# Patient Record
Sex: MALE | Race: WHITE | Employment: OTHER | URBAN - METROPOLITAN AREA
[De-identification: names, ages, dates, MRNs, and addresses within clinical notes are randomized per-mention and may not be internally consistent; named-entity substitution may affect disease eponyms.]

---

## 2017-07-01 ENCOUNTER — LAB ENCOUNTER (OUTPATIENT)
Dept: LAB | Facility: HOSPITAL | Age: 77
End: 2017-07-01
Attending: INTERNAL MEDICINE
Payer: MEDICARE

## 2017-07-01 DIAGNOSIS — E03.9 PRIMARY HYPOTHYROIDISM: ICD-10-CM

## 2017-07-01 DIAGNOSIS — E55.9 VITAMIN D DEFICIENCY: ICD-10-CM

## 2017-07-01 DIAGNOSIS — I10 ESSENTIAL HYPERTENSION, BENIGN: ICD-10-CM

## 2017-07-01 DIAGNOSIS — Z00.00 ROUTINE GENERAL MEDICAL EXAMINATION AT A HEALTH CARE FACILITY: ICD-10-CM

## 2017-07-01 DIAGNOSIS — D50.8 IRON DEFICIENCY ANEMIA DUE TO DIETARY CAUSES: ICD-10-CM

## 2017-07-01 DIAGNOSIS — E78.00 PURE HYPERCHOLESTEROLEMIA: ICD-10-CM

## 2017-07-01 DIAGNOSIS — E11.9 DIABETES MELLITUS WITHOUT COMPLICATION (HCC): ICD-10-CM

## 2017-07-01 LAB
ALBUMIN SERPL-MCNC: 3.1 G/DL (ref 3.5–4.8)
ALP LIVER SERPL-CCNC: 91 U/L (ref 45–117)
ALT SERPL-CCNC: 19 U/L (ref 17–63)
AST SERPL-CCNC: 14 U/L (ref 15–41)
BASOPHILS # BLD AUTO: 0.05 X10(3) UL (ref 0–0.1)
BASOPHILS NFR BLD AUTO: 0.6 %
BILIRUB SERPL-MCNC: 0.4 MG/DL (ref 0.1–2)
BUN BLD-MCNC: 25 MG/DL (ref 8–20)
CALCIUM BLD-MCNC: 8.7 MG/DL (ref 8.3–10.3)
CHLORIDE: 111 MMOL/L (ref 101–111)
CHOLEST SMN-MCNC: 171 MG/DL (ref ?–200)
CO2: 28 MMOL/L (ref 22–32)
CREAT BLD-MCNC: 1.21 MG/DL (ref 0.7–1.3)
EOSINOPHIL # BLD AUTO: 0.29 X10(3) UL (ref 0–0.3)
EOSINOPHIL NFR BLD AUTO: 3.6 %
ERYTHROCYTE [DISTWIDTH] IN BLOOD BY AUTOMATED COUNT: 16.1 % (ref 11.5–16)
EST. AVERAGE GLUCOSE BLD GHB EST-MCNC: 126 MG/DL (ref 68–126)
GLUCOSE BLD-MCNC: 85 MG/DL (ref 70–99)
HBA1C MFR BLD HPLC: 6 % (ref ?–5.7)
HCT VFR BLD AUTO: 42 % (ref 37–53)
HDLC SERPL-MCNC: 62 MG/DL (ref 45–?)
HDLC SERPL: 2.76 {RATIO} (ref ?–4.97)
HGB BLD-MCNC: 13.4 G/DL (ref 13–17)
IMMATURE GRANULOCYTE COUNT: 0.03 X10(3) UL (ref 0–1)
IMMATURE GRANULOCYTE RATIO %: 0.4 %
LDLC SERPL CALC-MCNC: 86 MG/DL (ref ?–130)
LYMPHOCYTES # BLD AUTO: 1.69 X10(3) UL (ref 0.9–4)
LYMPHOCYTES NFR BLD AUTO: 20.9 %
M PROTEIN MFR SERPL ELPH: 7.3 G/DL (ref 6.1–8.3)
MCH RBC QN AUTO: 29.6 PG (ref 27–33.2)
MCHC RBC AUTO-ENTMCNC: 31.9 G/DL (ref 31–37)
MCV RBC AUTO: 92.9 FL (ref 80–99)
MONOCYTES # BLD AUTO: 0.51 X10(3) UL (ref 0.1–0.6)
MONOCYTES NFR BLD AUTO: 6.3 %
NEUTROPHIL ABS PRELIM: 5.52 X10 (3) UL (ref 1.3–6.7)
NEUTROPHILS # BLD AUTO: 5.52 X10(3) UL (ref 1.3–6.7)
NEUTROPHILS NFR BLD AUTO: 68.2 %
NONHDLC SERPL-MCNC: 109 MG/DL (ref ?–130)
PLATELET # BLD AUTO: 233 10(3)UL (ref 150–450)
POTASSIUM SERPL-SCNC: 4 MMOL/L (ref 3.6–5.1)
PSA SERPL-MCNC: 1.41 NG/ML (ref 0.01–4)
RBC # BLD AUTO: 4.52 X10(6)UL (ref 3.8–5.8)
RED CELL DISTRIBUTION WIDTH-SD: 54.7 FL (ref 35.1–46.3)
SODIUM SERPL-SCNC: 145 MMOL/L (ref 136–144)
TRIGLYCERIDES: 114 MG/DL (ref ?–150)
TSI SER-ACNC: 3.23 MIU/ML (ref 0.35–5.5)
VLDL: 23 MG/DL (ref 5–40)
WBC # BLD AUTO: 8.1 X10(3) UL (ref 4–13)

## 2017-07-01 PROCEDURE — 85025 COMPLETE CBC W/AUTO DIFF WBC: CPT

## 2017-07-01 PROCEDURE — 84443 ASSAY THYROID STIM HORMONE: CPT

## 2017-07-01 PROCEDURE — 84153 ASSAY OF PSA TOTAL: CPT

## 2017-07-01 PROCEDURE — 80053 COMPREHEN METABOLIC PANEL: CPT

## 2017-07-01 PROCEDURE — 80061 LIPID PANEL: CPT

## 2017-07-01 PROCEDURE — 83036 HEMOGLOBIN GLYCOSYLATED A1C: CPT

## 2017-07-01 PROCEDURE — 36415 COLL VENOUS BLD VENIPUNCTURE: CPT

## 2017-07-01 PROCEDURE — 82652 VIT D 1 25-DIHYDROXY: CPT

## 2017-07-03 LAB — 1,25-DIHYDROXYVITAMIN D: 28.5 PG/ML

## 2018-01-01 PROCEDURE — 87040 BLOOD CULTURE FOR BACTERIA: CPT | Performed by: FAMILY MEDICINE

## 2018-09-06 ENCOUNTER — HOSPITAL ENCOUNTER (INPATIENT)
Facility: HOSPITAL | Age: 78
LOS: 3 days | Discharge: NURSING, REHAB OR LONG-TERM CARE | DRG: 178 | End: 2018-09-11
Attending: EMERGENCY MEDICINE | Admitting: HOSPITALIST
Payer: MEDICARE

## 2018-09-06 ENCOUNTER — APPOINTMENT (OUTPATIENT)
Dept: GENERAL RADIOLOGY | Facility: HOSPITAL | Age: 78
DRG: 178 | End: 2018-09-06
Attending: EMERGENCY MEDICINE
Payer: MEDICARE

## 2018-09-06 ENCOUNTER — APPOINTMENT (OUTPATIENT)
Dept: CT IMAGING | Facility: HOSPITAL | Age: 78
DRG: 178 | End: 2018-09-06
Attending: EMERGENCY MEDICINE
Payer: MEDICARE

## 2018-09-06 DIAGNOSIS — I63.89 CEREBROVASCULAR ACCIDENT (CVA) DUE TO OTHER MECHANISM (HCC): Primary | ICD-10-CM

## 2018-09-06 DIAGNOSIS — R07.9 CHEST PAIN OF UNCERTAIN ETIOLOGY: ICD-10-CM

## 2018-09-06 PROBLEM — I63.9 CEREBROVASCULAR ACCIDENT (HCC): Status: ACTIVE | Noted: 2018-09-06

## 2018-09-06 LAB
ALBUMIN SERPL-MCNC: 2.3 G/DL (ref 3.5–4.8)
ALBUMIN/GLOB SERPL: 0.4 {RATIO} (ref 1–2)
ALP LIVER SERPL-CCNC: 95 U/L (ref 45–117)
ALT SERPL-CCNC: 15 U/L (ref 17–63)
ANION GAP SERPL CALC-SCNC: 8 MMOL/L (ref 0–18)
AST SERPL-CCNC: 21 U/L (ref 15–41)
BASOPHILS # BLD AUTO: 0.03 X10(3) UL (ref 0–0.1)
BASOPHILS NFR BLD AUTO: 0.3 %
BILIRUB SERPL-MCNC: 0.3 MG/DL (ref 0.1–2)
BUN BLD-MCNC: 16 MG/DL (ref 8–20)
BUN/CREAT SERPL: 17.8 (ref 10–20)
CALCIUM BLD-MCNC: 8.7 MG/DL (ref 8.3–10.3)
CHLORIDE SERPL-SCNC: 101 MMOL/L (ref 101–111)
CHOLEST SMN-MCNC: 121 MG/DL (ref ?–200)
CO2 SERPL-SCNC: 26 MMOL/L (ref 22–32)
CREAT BLD-MCNC: 0.9 MG/DL (ref 0.7–1.3)
D-DIMER: 11.2 UG/ML FEU (ref 0–0.49)
EOSINOPHIL # BLD AUTO: 0.3 X10(3) UL (ref 0–0.3)
EOSINOPHIL NFR BLD AUTO: 2.9 %
ERYTHROCYTE [DISTWIDTH] IN BLOOD BY AUTOMATED COUNT: 19 % (ref 11.5–16)
GLOBULIN PLAS-MCNC: 5.9 G/DL (ref 2.5–4)
GLUCOSE BLD-MCNC: 113 MG/DL (ref 70–99)
HCT VFR BLD AUTO: 39.6 % (ref 37–53)
HDLC SERPL-MCNC: 51 MG/DL (ref 40–59)
HGB BLD-MCNC: 12.2 G/DL (ref 13–17)
IMMATURE GRANULOCYTE COUNT: 0.07 X10(3) UL (ref 0–1)
IMMATURE GRANULOCYTE RATIO %: 0.7 %
LACTIC ACID: 2 MMOL/L (ref 0.5–2)
LDLC SERPL CALC-MCNC: 44 MG/DL (ref ?–100)
LYMPHOCYTES # BLD AUTO: 1.01 X10(3) UL (ref 0.9–4)
LYMPHOCYTES NFR BLD AUTO: 9.9 %
M PROTEIN MFR SERPL ELPH: 8.2 G/DL (ref 6.1–8.3)
MCH RBC QN AUTO: 28.2 PG (ref 27–33.2)
MCHC RBC AUTO-ENTMCNC: 30.8 G/DL (ref 31–37)
MCV RBC AUTO: 91.5 FL (ref 80–99)
MONOCYTES # BLD AUTO: 0.45 X10(3) UL (ref 0.1–1)
MONOCYTES NFR BLD AUTO: 4.4 %
NEUTROPHIL ABS PRELIM: 8.36 X10 (3) UL (ref 1.3–6.7)
NEUTROPHILS # BLD AUTO: 8.36 X10(3) UL (ref 1.3–6.7)
NEUTROPHILS NFR BLD AUTO: 81.8 %
NONHDLC SERPL-MCNC: 70 MG/DL (ref ?–130)
OSMOLALITY SERPL CALC.SUM OF ELEC: 282 MOSM/KG (ref 275–295)
PLATELET # BLD AUTO: 336 10(3)UL (ref 150–450)
POTASSIUM SERPL-SCNC: 4.3 MMOL/L (ref 3.6–5.1)
RBC # BLD AUTO: 4.33 X10(6)UL (ref 3.8–5.8)
RED CELL DISTRIBUTION WIDTH-SD: 63.1 FL (ref 35.1–46.3)
SODIUM SERPL-SCNC: 135 MMOL/L (ref 136–144)
TRIGL SERPL-MCNC: 130 MG/DL (ref 30–149)
TROPONIN I SERPL-MCNC: <0.046 NG/ML (ref ?–0.05)
VLDLC SERPL CALC-MCNC: 26 MG/DL (ref 0–30)
WBC # BLD AUTO: 10.2 X10(3) UL (ref 4–13)

## 2018-09-06 PROCEDURE — 80053 COMPREHEN METABOLIC PANEL: CPT | Performed by: EMERGENCY MEDICINE

## 2018-09-06 PROCEDURE — 71045 X-RAY EXAM CHEST 1 VIEW: CPT | Performed by: EMERGENCY MEDICINE

## 2018-09-06 PROCEDURE — 85025 COMPLETE CBC W/AUTO DIFF WBC: CPT

## 2018-09-06 PROCEDURE — 93005 ELECTROCARDIOGRAM TRACING: CPT

## 2018-09-06 PROCEDURE — 36415 COLL VENOUS BLD VENIPUNCTURE: CPT

## 2018-09-06 PROCEDURE — 80061 LIPID PANEL: CPT | Performed by: HOSPITALIST

## 2018-09-06 PROCEDURE — 96360 HYDRATION IV INFUSION INIT: CPT

## 2018-09-06 PROCEDURE — 83605 ASSAY OF LACTIC ACID: CPT | Performed by: EMERGENCY MEDICINE

## 2018-09-06 PROCEDURE — 87040 BLOOD CULTURE FOR BACTERIA: CPT | Performed by: EMERGENCY MEDICINE

## 2018-09-06 PROCEDURE — 85025 COMPLETE CBC W/AUTO DIFF WBC: CPT | Performed by: EMERGENCY MEDICINE

## 2018-09-06 PROCEDURE — 80053 COMPREHEN METABOLIC PANEL: CPT

## 2018-09-06 PROCEDURE — 93010 ELECTROCARDIOGRAM REPORT: CPT

## 2018-09-06 PROCEDURE — 84484 ASSAY OF TROPONIN QUANT: CPT | Performed by: EMERGENCY MEDICINE

## 2018-09-06 PROCEDURE — 99285 EMERGENCY DEPT VISIT HI MDM: CPT

## 2018-09-06 PROCEDURE — 70450 CT HEAD/BRAIN W/O DYE: CPT | Performed by: EMERGENCY MEDICINE

## 2018-09-06 PROCEDURE — 71275 CT ANGIOGRAPHY CHEST: CPT | Performed by: EMERGENCY MEDICINE

## 2018-09-06 PROCEDURE — 85378 FIBRIN DEGRADE SEMIQUANT: CPT | Performed by: EMERGENCY MEDICINE

## 2018-09-06 RX ORDER — HEPARIN SODIUM 5000 [USP'U]/ML
5000 INJECTION, SOLUTION INTRAVENOUS; SUBCUTANEOUS EVERY 12 HOURS SCHEDULED
Status: DISCONTINUED | OUTPATIENT
Start: 2018-09-06 | End: 2018-09-11

## 2018-09-06 RX ORDER — ACETAMINOPHEN 650 MG/1
650 SUPPOSITORY RECTAL EVERY 6 HOURS PRN
Status: DISCONTINUED | OUTPATIENT
Start: 2018-09-06 | End: 2018-09-11

## 2018-09-06 RX ORDER — ACETAMINOPHEN 325 MG/1
650 TABLET ORAL EVERY 6 HOURS PRN
Status: DISCONTINUED | OUTPATIENT
Start: 2018-09-06 | End: 2018-09-11

## 2018-09-06 RX ORDER — OMEPRAZOLE 20 MG/1
20 CAPSULE, DELAYED RELEASE ORAL
Status: ON HOLD | COMMUNITY
End: 2018-09-11

## 2018-09-06 RX ORDER — MELATONIN
1000 DAILY
Status: ON HOLD | COMMUNITY
End: 2019-01-01

## 2018-09-06 RX ORDER — SIMVASTATIN 20 MG
20 TABLET ORAL NIGHTLY
Status: ON HOLD | COMMUNITY
End: 2018-09-11

## 2018-09-06 NOTE — ED INITIAL ASSESSMENT (HPI)
Hx of stroke while in [facility] 2 months ago. Just landed from there and pt was brought here by his family due to cough.  No fever

## 2018-09-07 LAB
ANION GAP SERPL CALC-SCNC: 7 MMOL/L (ref 0–18)
BASOPHILS # BLD AUTO: 0.03 X10(3) UL (ref 0–0.1)
BASOPHILS NFR BLD AUTO: 0.5 %
BUN BLD-MCNC: 12 MG/DL (ref 8–20)
BUN/CREAT SERPL: 20 (ref 10–20)
CALCIUM BLD-MCNC: 7.6 MG/DL (ref 8.3–10.3)
CHLORIDE SERPL-SCNC: 106 MMOL/L (ref 101–111)
CO2 SERPL-SCNC: 26 MMOL/L (ref 22–32)
CREAT BLD-MCNC: 0.6 MG/DL (ref 0.7–1.3)
EOSINOPHIL # BLD AUTO: 0.28 X10(3) UL (ref 0–0.3)
EOSINOPHIL NFR BLD AUTO: 5.1 %
ERYTHROCYTE [DISTWIDTH] IN BLOOD BY AUTOMATED COUNT: 18.6 % (ref 11.5–16)
GLUCOSE BLD-MCNC: 72 MG/DL (ref 70–99)
GLUCOSE BLD-MCNC: 74 MG/DL (ref 65–99)
GLUCOSE BLD-MCNC: 81 MG/DL (ref 65–99)
HCT VFR BLD AUTO: 31.1 % (ref 37–53)
HGB BLD-MCNC: 9.6 G/DL (ref 13–17)
IMMATURE GRANULOCYTE COUNT: 0.02 X10(3) UL (ref 0–1)
IMMATURE GRANULOCYTE RATIO %: 0.4 %
LYMPHOCYTES # BLD AUTO: 0.74 X10(3) UL (ref 0.9–4)
LYMPHOCYTES NFR BLD AUTO: 13.5 %
MCH RBC QN AUTO: 28.5 PG (ref 27–33.2)
MCHC RBC AUTO-ENTMCNC: 30.9 G/DL (ref 31–37)
MCV RBC AUTO: 92.3 FL (ref 80–99)
MONOCYTES # BLD AUTO: 0.33 X10(3) UL (ref 0.1–1)
MONOCYTES NFR BLD AUTO: 6 %
NEUTROPHIL ABS PRELIM: 4.1 X10 (3) UL (ref 1.3–6.7)
NEUTROPHILS # BLD AUTO: 4.1 X10(3) UL (ref 1.3–6.7)
NEUTROPHILS NFR BLD AUTO: 74.5 %
OSMOLALITY SERPL CALC.SUM OF ELEC: 286 MOSM/KG (ref 275–295)
PLATELET # BLD AUTO: 229 10(3)UL (ref 150–450)
POTASSIUM SERPL-SCNC: 3.7 MMOL/L (ref 3.6–5.1)
PROCALCITONIN SERPL-MCNC: <0.11 NG/ML
RBC # BLD AUTO: 3.37 X10(6)UL (ref 3.8–5.8)
RED CELL DISTRIBUTION WIDTH-SD: 63.3 FL (ref 35.1–46.3)
SODIUM SERPL-SCNC: 139 MMOL/L (ref 136–144)
WBC # BLD AUTO: 5.5 X10(3) UL (ref 4–13)

## 2018-09-07 PROCEDURE — 97162 PT EVAL MOD COMPLEX 30 MIN: CPT

## 2018-09-07 PROCEDURE — S0077 INJECTION, CLINDAMYCIN PHOSP: HCPCS | Performed by: INTERNAL MEDICINE

## 2018-09-07 PROCEDURE — 97760 ORTHOTIC MGMT&TRAING 1ST ENC: CPT

## 2018-09-07 PROCEDURE — 97166 OT EVAL MOD COMPLEX 45 MIN: CPT

## 2018-09-07 PROCEDURE — 82962 GLUCOSE BLOOD TEST: CPT

## 2018-09-07 PROCEDURE — 84145 PROCALCITONIN (PCT): CPT | Performed by: INTERNAL MEDICINE

## 2018-09-07 PROCEDURE — 85025 COMPLETE CBC W/AUTO DIFF WBC: CPT | Performed by: HOSPITALIST

## 2018-09-07 PROCEDURE — 97530 THERAPEUTIC ACTIVITIES: CPT

## 2018-09-07 PROCEDURE — 87040 BLOOD CULTURE FOR BACTERIA: CPT | Performed by: INTERNAL MEDICINE

## 2018-09-07 PROCEDURE — 97535 SELF CARE MNGMENT TRAINING: CPT

## 2018-09-07 PROCEDURE — 80048 BASIC METABOLIC PNL TOTAL CA: CPT | Performed by: HOSPITALIST

## 2018-09-07 PROCEDURE — 92610 EVALUATE SWALLOWING FUNCTION: CPT

## 2018-09-07 RX ORDER — ASPIRIN 325 MG
325 TABLET, DELAYED RELEASE (ENTERIC COATED) ORAL DAILY
Status: DISCONTINUED | OUTPATIENT
Start: 2018-09-07 | End: 2018-09-11

## 2018-09-07 RX ORDER — ONDANSETRON 4 MG/1
4 TABLET, ORALLY DISINTEGRATING ORAL EVERY 6 HOURS PRN
Status: DISCONTINUED | OUTPATIENT
Start: 2018-09-07 | End: 2018-09-11

## 2018-09-07 RX ORDER — ALBUTEROL SULFATE 2.5 MG/3ML
2.5 SOLUTION RESPIRATORY (INHALATION) EVERY 6 HOURS PRN
Status: DISCONTINUED | OUTPATIENT
Start: 2018-09-07 | End: 2018-09-11

## 2018-09-07 RX ORDER — ASPIRIN 300 MG
300 SUPPOSITORY, RECTAL RECTAL DAILY
Status: DISCONTINUED | OUTPATIENT
Start: 2018-09-07 | End: 2018-09-11

## 2018-09-07 RX ORDER — ONDANSETRON 2 MG/ML
4 INJECTION INTRAMUSCULAR; INTRAVENOUS EVERY 6 HOURS PRN
Status: DISCONTINUED | OUTPATIENT
Start: 2018-09-07 | End: 2018-09-11

## 2018-09-07 RX ORDER — CLINDAMYCIN PHOSPHATE 600 MG/50ML
600 INJECTION INTRAVENOUS EVERY 8 HOURS
Status: DISCONTINUED | OUTPATIENT
Start: 2018-09-07 | End: 2018-09-09

## 2018-09-07 RX ORDER — POTASSIUM CHLORIDE 14.9 MG/ML
20 INJECTION INTRAVENOUS ONCE
Status: COMPLETED | OUTPATIENT
Start: 2018-09-07 | End: 2018-09-07

## 2018-09-07 NOTE — HOME CARE LIAISON
[address]. MET WITH SON AT BEDSIDE. CONFIRMED PATIENT AND SON WILL WANT HH SERVICES AT [place]. [facility] 34 BED ORDER TO ORBIT. HAVE CALLED AND REQUESTED DR. Naa Wilson SIGN FOR HH AND COMPLETE PAPERWORK FOR DME IF NEEDED.

## 2018-09-07 NOTE — PHYSICAL THERAPY NOTE
PHYSICAL THERAPY EVALUATION - INPATIENT     Room Number: 7780/1101-Y  Evaluation Date: 9/7/2018  Type of Evaluation: Initial  Physician Order: PT Eval and Treat    Presenting Problem: cough, recent CVA  Reason for Therapy: Mobility Dysfunction and Leopold Sydney aphasia    RANGE OF MOTION AND STRENGTH ASSESSMENT  Upper extremity ROM and strength: See OT eval    Lower extremity ROM is within functional limits except for the following:   Right Hip flexion <1/2 PROM, no AROM  Left Hip flexion <1/2 AROM  Right Knee ex Pt able to sit EOB x 10 minutes, participate in WB through RUE and also LUE through elbow in sitting, noted significant contracture/tighness in R side trunk and R hip-pt able to participate in stretch.  Pt is unable to stand safely due to contractures in RL transfers, hospital bed. Also recommend HHPT for further family/caregiver education training, HEP program instruction.   DISCHARGE RECOMMENDATIONS  PT Discharge Recommendations: 24 hour care/supervision;Home with home health PT/OT    PLAN  PT Treatment Plan

## 2018-09-07 NOTE — H&P
[facility] Patient Status:  Observation    1940 MRN [id number]   [facility] 7NE-A Attending Karen Angelo MD   Hosp Day # 0 PCP Primo Andres MD     Cc: cough    History of Present Illne by mouth every morning before breakfast. Disp:  Rfl:  Past Week at 0900   Sertraline HCl 50 MG Oral Tab Take 50 mg by mouth daily. Disp:  Rfl:  Past Week at 0900   simvastatin 20 MG Oral Tab Take 20 mg by mouth nightly.  Disp:  Rfl:  Past Week at [facility] 92.3   PLT  336.0  229.0       Recent Labs   Lab  09/06/18   1801  09/07/18   0526   NA  135*  139   K  4.3  3.7   CL  101  106   CO2  26.0  26.0   BUN  16  12   CREATSERUM  0.90  0.60*   CA  8.7  7.6*   GLU  113*  72       Recent Labs   Lab  09/06/18   1 sided weakness and suspected aspiration   - CT brain reviewed as above without acute change  - daily ASA, resume statin once po  - PT / OT / SLP SAM starkey consult    # cough, pulmonary fibrosis  - seen on CT on admission, progression since 2015  - elevated MCH  28.2  28.5   MCHC  30.8*  30.9*   RDW  19.0*  18.6*   NEPRELIM  8.36*  4.10   WBC  10.2  5.5   PLT  336.0  229.0     Recent Labs   Lab  09/06/18   1801  09/07/18   0526   GLU  113*  72   BUN  16  12   CREATSERUM  0.90  0.60*   GFRAA  94  111   GFRNA

## 2018-09-07 NOTE — CONSULTS
Pulmonary H&P/Consult       NAME: [address] [place] [address] Street: 8980/0204-G - MRN: [id number] - Age: 66year old - :  1940    Date of Admission: 2018  5:38 PM  Admission Diagnosis: Chest pain of uncertain etiology [J34.08]  Cerebrovascular accident daily. Disp:  Rfl:  Past Week at Unknown time   METHOTREXATE OR Take by mouth.  Disp:  Rfl:  Past Week at Unknown time   NON FORMULARY Paracetamol 500mg 1 tablet as needed for headaches or fever Disp:  Rfl:  Past Week at Unknown time   NON FORMULARY nitrofu Sodium (Porcine)  5,000 Units Subcutaneous 2 times per day     Continuous Infusing Medication:    PRN Medication:albuterol sulfate, ondansetron **OR** ondansetron HCl, acetaminophen, acetaminophen     REVIEW OF SYSTEMS:   GENERAL:  feels well otherwise   S symmetrical, trachea midline, no adenopathy;        thyroid:  No enlargement/tenderness/nodules; no carotid    bruit or JVD   Back:     Symmetric, no curvature, ROM normal, no CVA tenderness   Lungs:     Clear to auscultation bilaterally, respirations unla Range Status   07/30/2012 03:40 PM 3.5 3.5 - 4.8 g/dL Final   ----------    Imaging: CT chest reviewed- progression of previously seen pulm fibrosis mckenzie, debris noted in airways, more on the right than the left    ASSESSMENT/PLAN:    1.  AMS  -unclear what

## 2018-09-07 NOTE — SLP NOTE
ADULT SWALLOWING EVALUATION    ASSESSMENT    ASSESSMENT/OVERALL IMPRESSION:  Order received for bedside swallow evaluation per protocol. Pt is a 66year old male with hx of pneumonia and CVA 2 months prior resulting in aphasia.  Pt was admitted after demons Recommendations: Non-oral  Treatment Plan/Recommendations: Videofluoroscopic swallow study  Discharge Recommendations/Plan: Undetermined    HISTORY   MEDICAL HISTORY  Reason for Referral: R/O aspiration    Problem List  Principal Problem:    [place] Impaired  Laryngeal Elevation Timing: Appears impaired  Laryngeal Elevation Strength: Appears impaired  Laryngeal Elevation Coordination: Appears impaired  (Please note: Silent aspiration cannot be evaluated clinically.  Videofluoroscopic Swallow Study is r

## 2018-09-07 NOTE — PLAN OF CARE
Pt non-verbal, RUE contracted, Rt sided weakness  On RA, NSR per tele, denies any pain  Incontinent of bladder and bowel, needs attended too   Pt kept NPO, Speech eval completed, Video swallow ordered  PT/OT rec home w/ HH and 24 hr caregiver  PLAN: social

## 2018-09-07 NOTE — ED PROVIDER NOTES
Patient Seen in: [facility] Emergency Department    History   Patient presents with:  Cough/URI    Stated Complaint: cough, stroke 2 months ago while in [place], just arrived.  brought by family fo*    HPI    72-year-old male, long-time smoker, known 90  Resp: 16  Temp: 97.6 °F (36.4 °C)  Temp src: Temporal  SpO2: 95 %  O2 Device: None (Room air)    Current:BP 97/71   Pulse 80   Temp 97.6 °F (36.4 °C) (Temporal)   Resp 18   Wt 77.1 kg   SpO2 95%   BMI 23.71 kg/m²         Physical Exam      Constitution Neutrophil Absolute 8.36 (*)     All other components within normal limits   LACTIC ACID, PLASMA - Normal   TROPONIN I - Normal   CBC WITH DIFFERENTIAL WITH PLATELET    Narrative:      The following orders were created for panel order CBC WITH DIFFERENTI patient is seen but was cardiology in the past it seems.         Disposition and Plan     Clinical Impression:  Cerebrovascular accident (CVA) due to other mechanism [facility])  (primary encounter diagnosis)  Chest pain of uncertain etiology    Disposition:  Adm

## 2018-09-07 NOTE — OCCUPATIONAL THERAPY NOTE
OCCUPATIONAL THERAPY EVALUATION - INPATIENT     Room Number: 6681/5544-N  Evaluation Date: 9/7/2018  Type of Evaluation: Initial  Presenting Problem: L sided CVA 2 months ago    Physician Order: IP Consult to Occupational Therapy  Reason for Therapy: ADL/I functional limits except for the following:  R UE contracture noted, elbow, wrist'    Upper extremity strength is within functional limits except for the following;  R sided flaccid, contracture    COORDINATION  Gross Motor   R UE and LE contracture   Fine bed;Needs met;Call light within reach;RN aware of session/findings; All patient questions and concerns addressed;SCDs in place; Family present    ASSESSMENT     Patient is a 66year old male admitted on 9/6/2018 for CVA 2 months ago.  Complete medical history will perform PROM R UE independently. 2. Family will javier R resting arm splint on independently. 3. Pt and family will perform supine to sit with max A x 1 to prepare for functional transfer.

## 2018-09-07 NOTE — CM/SW NOTE
Patient is aphasic, secondary to sustained stroke about 2 months ago while in [facility].   Family intends to have patient live with them at home, and has cleared out a room for him, yet they have no medical bed, or any other medical equipment to help manage h

## 2018-09-07 NOTE — PLAN OF CARE
Problem: Impaired Swallowing  Goal: Minimize aspiration risk  Interventions:  NPO until VSS  Outcome: Progressing

## 2018-09-07 NOTE — PLAN OF CARE
Problem: Impaired Swallowing  Goal: Minimize aspiration risk  Interventions:  NPO   Outcome: Progressing

## 2018-09-07 NOTE — PLAN OF CARE
Assumed care at 2300. Pt admitted to unit via cart. Pt aphasic, non verbal. Family at bedside. Family unsure of pt's diet after stroke, made NPO until speech eval.   Family noticing some pain in patient, tylenol suppository given.    Pt resting in bed c

## 2018-09-08 ENCOUNTER — APPOINTMENT (OUTPATIENT)
Dept: GENERAL RADIOLOGY | Facility: HOSPITAL | Age: 78
DRG: 178 | End: 2018-09-08
Attending: INTERNAL MEDICINE
Payer: MEDICARE

## 2018-09-08 ENCOUNTER — APPOINTMENT (OUTPATIENT)
Dept: GENERAL RADIOLOGY | Facility: HOSPITAL | Age: 78
DRG: 178 | End: 2018-09-08
Attending: HOSPITALIST
Payer: MEDICARE

## 2018-09-08 LAB
ERYTHROCYTE [DISTWIDTH] IN BLOOD BY AUTOMATED COUNT: 18.6 % (ref 11.5–16)
GLUCOSE BLD-MCNC: 119 MG/DL (ref 65–99)
GLUCOSE BLD-MCNC: 124 MG/DL (ref 65–99)
GLUCOSE BLD-MCNC: 177 MG/DL (ref 65–99)
GLUCOSE BLD-MCNC: 70 MG/DL (ref 65–99)
GLUCOSE BLD-MCNC: 76 MG/DL (ref 65–99)
GLUCOSE BLD-MCNC: 82 MG/DL (ref 65–99)
GLUCOSE BLD-MCNC: 84 MG/DL (ref 65–99)
GLUCOSE BLD-MCNC: 94 MG/DL (ref 65–99)
HAV IGM SER QL: 1.7 MG/DL (ref 1.8–2.5)
HCT VFR BLD AUTO: 32.3 % (ref 37–53)
HGB BLD-MCNC: 10 G/DL (ref 13–17)
MCH RBC QN AUTO: 28.3 PG (ref 27–33.2)
MCHC RBC AUTO-ENTMCNC: 31 G/DL (ref 31–37)
MCV RBC AUTO: 91.5 FL (ref 80–99)
PLATELET # BLD AUTO: 237 10(3)UL (ref 150–450)
POTASSIUM SERPL-SCNC: 4 MMOL/L (ref 3.6–5.1)
RBC # BLD AUTO: 3.53 X10(6)UL (ref 3.8–5.8)
RED CELL DISTRIBUTION WIDTH-SD: 61.4 FL (ref 35.1–46.3)
WBC # BLD AUTO: 5.9 X10(3) UL (ref 4–13)

## 2018-09-08 PROCEDURE — 92611 MOTION FLUOROSCOPY/SWALLOW: CPT

## 2018-09-08 PROCEDURE — 83735 ASSAY OF MAGNESIUM: CPT | Performed by: HOSPITALIST

## 2018-09-08 PROCEDURE — 82962 GLUCOSE BLOOD TEST: CPT

## 2018-09-08 PROCEDURE — 71046 X-RAY EXAM CHEST 2 VIEWS: CPT | Performed by: INTERNAL MEDICINE

## 2018-09-08 PROCEDURE — 84132 ASSAY OF SERUM POTASSIUM: CPT | Performed by: HOSPITALIST

## 2018-09-08 PROCEDURE — 74230 X-RAY XM SWLNG FUNCJ C+: CPT | Performed by: HOSPITALIST

## 2018-09-08 PROCEDURE — S0077 INJECTION, CLINDAMYCIN PHOSP: HCPCS | Performed by: INTERNAL MEDICINE

## 2018-09-08 PROCEDURE — 85027 COMPLETE CBC AUTOMATED: CPT | Performed by: PHYSICIAN ASSISTANT

## 2018-09-08 RX ORDER — DEXTROSE MONOHYDRATE 25 G/50ML
INJECTION, SOLUTION INTRAVENOUS
Status: DISPENSED
Start: 2018-09-08 | End: 2018-09-08

## 2018-09-08 RX ORDER — MAGNESIUM OXIDE 400 MG (241.3 MG MAGNESIUM) TABLET
400 TABLET ONCE
Status: COMPLETED | OUTPATIENT
Start: 2018-09-08 | End: 2018-09-08

## 2018-09-08 RX ORDER — POLYVINYL ALCOHOL 14 MG/ML
1 SOLUTION/ DROPS OPHTHALMIC 4 TIMES DAILY PRN
Status: DISCONTINUED | OUTPATIENT
Start: 2018-09-08 | End: 2018-09-11

## 2018-09-08 RX ORDER — DEXTROSE MONOHYDRATE 25 G/50ML
50 INJECTION, SOLUTION INTRAVENOUS
Status: DISCONTINUED | OUTPATIENT
Start: 2018-09-08 | End: 2018-09-11

## 2018-09-08 NOTE — PROGRESS NOTES
Pulmonary Progress Note      NAME: Joao Deluna - ROOM: [id number] - MRN: [id number] - Age: 66year old - : 1940    Assessment/Plan:    1. AMS  -uncertain baseline  -may be related to infection  2.  Pulm Fibrosis  -chronic, progressive  -consi BUN  16  12   --    CREATSERUM  0.90  0.60*   --    GFRAA  94  111   --    GFRNAA  82  96   --    CA  8.7  7.6*   --    ALB  2.3*   --    --    NA  135*  139   --    K  4.3  3.7  4.0   CL  101  106   --    CO2  26.0  26.0   --    ALKPHO  95   --    --

## 2018-09-08 NOTE — SLP NOTE
ADULT VIDEOFLUOROSCOPIC SWALLOWING STUDY    Admission Date: 9/6/2018  Evaluation Date: 09/08/18  Radiologist: Dr. Nathalia Dean   Diet Recommendations - Solids: Mechanical soft ground  Diet Recommendations - Liquid: Nectar thick  ASPIRATION MN Clinical correlation recommended. HEADT CT 9/6/18: Large area of encephalomalacia change of the left MCA distribution. No evidence of acute intracranial process.     Reason for Referral: R/O aspiration      Family/Patient Goals:  \"he hasn't eaten i Penetration: None  Tracheal Aspiration: None     HARD SOLID  Oral Phase of Swallow (VFSS - Hard Solid): Impaired  Bolus Retrieval (VFSS - Hard Solid): Impaired  Bilabial Seal (VFSS - Hard Solid): Impaired  Bolus Formation (VFSS - Hard Solid):  Impaired  Carles Gutter intervention for dysphagia tx, diet texture analysis, education/training, and diet upgrade as patient progresses. EDUCATION/INSTRUCTION  Reviewed results and recommendations with patient/family/caregiver.   Agreement/Understanding verbalized and all ques

## 2018-09-08 NOTE — PLAN OF CARE
Problem: Impaired Swallowing  Goal: Minimize aspiration risk  Interventions:  - Patient should be alert and upright for all feedings (90 degrees preferred)  - Offer food and liquids at a slow rate  - Encourage small bites of food and small sips of liquid,

## 2018-09-08 NOTE — PLAN OF CARE
Assumed care at [address]. Patient alert, eyes tracking movements in room. Family at bedside. VSS on 2L NC. Family reports pt coughing up sputum. NSR per tele. Resting in bed comfortably. Will continue to monitor.      0796- Pt POC 70, D50% given per

## 2018-09-08 NOTE — PLAN OF CARE
Assumed patient care 0730. Patient alert, arouses to voice, stimulation. VSS during shift, weaned to room air during day, sats maintained greater than 90%. Video swallow completed today. Aspiration and fall precautions maintained.  Pt incontinent, brief in

## 2018-09-09 LAB
ATRIAL RATE: 80 BPM
BASOPHILS # BLD AUTO: 0.03 X10(3) UL (ref 0–0.1)
BASOPHILS NFR BLD AUTO: 0.4 %
EOSINOPHIL # BLD AUTO: 0.1 X10(3) UL (ref 0–0.3)
EOSINOPHIL NFR BLD AUTO: 1.3 %
ERYTHROCYTE [DISTWIDTH] IN BLOOD BY AUTOMATED COUNT: 18.7 % (ref 11.5–16)
GLUCOSE BLD-MCNC: 89 MG/DL (ref 65–99)
HAV IGM SER QL: 1.8 MG/DL (ref 1.8–2.5)
HCT VFR BLD AUTO: 30.9 % (ref 37–53)
HGB BLD-MCNC: 9.7 G/DL (ref 13–17)
IMMATURE GRANULOCYTE COUNT: 0.03 X10(3) UL (ref 0–1)
IMMATURE GRANULOCYTE RATIO %: 0.4 %
LYMPHOCYTES # BLD AUTO: 1.17 X10(3) UL (ref 0.9–4)
LYMPHOCYTES NFR BLD AUTO: 15.1 %
MCH RBC QN AUTO: 28.6 PG (ref 27–33.2)
MCHC RBC AUTO-ENTMCNC: 31.4 G/DL (ref 31–37)
MCV RBC AUTO: 91.2 FL (ref 80–99)
MONOCYTES # BLD AUTO: 0.61 X10(3) UL (ref 0.1–1)
MONOCYTES NFR BLD AUTO: 7.9 %
NEUTROPHIL ABS PRELIM: 5.82 X10 (3) UL (ref 1.3–6.7)
NEUTROPHILS # BLD AUTO: 5.82 X10(3) UL (ref 1.3–6.7)
NEUTROPHILS NFR BLD AUTO: 74.9 %
P AXIS: 7 DEGREES
P-R INTERVAL: 148 MS
PLATELET # BLD AUTO: 218 10(3)UL (ref 150–450)
Q-T INTERVAL: 404 MS
QRS DURATION: 86 MS
QTC CALCULATION (BEZET): 465 MS
R AXIS: -7 DEGREES
RBC # BLD AUTO: 3.39 X10(6)UL (ref 3.8–5.8)
RED CELL DISTRIBUTION WIDTH-SD: 62.2 FL (ref 35.1–46.3)
T AXIS: 9 DEGREES
VENTRICULAR RATE: 80 BPM
WBC # BLD AUTO: 7.8 X10(3) UL (ref 4–13)

## 2018-09-09 PROCEDURE — S0077 INJECTION, CLINDAMYCIN PHOSP: HCPCS | Performed by: INTERNAL MEDICINE

## 2018-09-09 PROCEDURE — 94640 AIRWAY INHALATION TREATMENT: CPT

## 2018-09-09 PROCEDURE — 94664 DEMO&/EVAL PT USE INHALER: CPT

## 2018-09-09 PROCEDURE — 97164 PT RE-EVAL EST PLAN CARE: CPT

## 2018-09-09 PROCEDURE — 97530 THERAPEUTIC ACTIVITIES: CPT

## 2018-09-09 PROCEDURE — 85025 COMPLETE CBC W/AUTO DIFF WBC: CPT | Performed by: HOSPITALIST

## 2018-09-09 PROCEDURE — 83735 ASSAY OF MAGNESIUM: CPT | Performed by: HOSPITALIST

## 2018-09-09 PROCEDURE — 82962 GLUCOSE BLOOD TEST: CPT

## 2018-09-09 PROCEDURE — 97110 THERAPEUTIC EXERCISES: CPT

## 2018-09-09 RX ORDER — MAGNESIUM OXIDE 400 MG (241.3 MG MAGNESIUM) TABLET
400 TABLET ONCE
Status: COMPLETED | OUTPATIENT
Start: 2018-09-09 | End: 2018-09-09

## 2018-09-09 RX ORDER — TIZANIDINE 4 MG/1
2 TABLET ORAL EVERY 8 HOURS PRN
Status: DISCONTINUED | OUTPATIENT
Start: 2018-09-09 | End: 2018-09-11

## 2018-09-09 RX ORDER — ATORVASTATIN CALCIUM 10 MG/1
10 TABLET, FILM COATED ORAL NIGHTLY
Status: DISCONTINUED | OUTPATIENT
Start: 2018-09-09 | End: 2018-09-11

## 2018-09-09 RX ORDER — PANTOPRAZOLE SODIUM 20 MG/1
20 TABLET, DELAYED RELEASE ORAL
Status: DISCONTINUED | OUTPATIENT
Start: 2018-09-09 | End: 2018-09-11

## 2018-09-09 NOTE — CM/SW NOTE
Pt now recommending DUARTE. SW attempted to contact the pt's son again. The voicemail was full. SW to follow up regarding DUARTE placement.

## 2018-09-09 NOTE — PLAN OF CARE
Assumed care at [address]. Patient alert, appears comfortable. VSS on 2L NC overnight. NSR per tele. Son at bedside, addressed concerns and questions about POC. Incontinent, briefed, needs attended to. Resting in bed comfortably.  Will continue to monit

## 2018-09-09 NOTE — PROGRESS NOTES
[facility] Hospitalist Progress Note     Edgar Boles Patient Status:  Inpatient    1940 MRN [id number]   [facility] 7NE-A Attending Marybel Chatterjee MD   Hosp Day # 1 PCP Karyle Alice, MD     CC: follow up    SUBJECTIVE:  Son and Katerine Ricci Medication:  PRN Medication:glucose **OR** Glucose-Vitamin C **OR** dextrose **OR** glucose **OR** Glucose-Vitamin C, Polyvinyl Alcohol, albuterol sulfate, ondansetron **OR** ondansetron HCl, acetaminophen, acetaminophen        Assessment/Plan:     [employer] bedside.     Cristobal Nur MD   [address] [place] [address] Hospitalist  [phone]

## 2018-09-09 NOTE — PLAN OF CARE
Assumed patient care 0730. Patient alert, arouses to voice, stimulation. Oxygen saturations maintained greater then 90% on room air during shift. Diet mechanical soft ground with nectar thick liquids, pt tolerating.  Aspiration and fall precautions maintain

## 2018-09-09 NOTE — PROGRESS NOTES
Pulmonary Progress Note      NAME: Yolanda Eddy - ROOM: 3742/0013-W - MRN: [id number] - Age: 66year old - : 1940    Assessment/Plan:  1. AMS  -uncertain baseline  -may be related to infection  2.  Pulm Fibrosis  -chronic, progressive  -[place] 09/08/18   0525   GLU  113*  72   --    BUN  16  12   --    CREATSERUM  0.90  0.60*   --    GFRAA  94  111   --    GFRNAA  82  96   --    CA  8.7  7.6*   --    ALB  2.3*   --    --    NA  135*  139   --    K  4.3  3.7  4.0   CL  101  106   --    CO2  26.0

## 2018-09-09 NOTE — CM/SW NOTE
SW attempted to call pt's son for a second time. Mailbox is full. Unable to leave a message. PT now recommending DUARTE.

## 2018-09-09 NOTE — CM/SW NOTE
SW spoke w/RN regarding pt. Rn stated the pt is concerned PT is recommending HHPT/24 hour care. She stated the family is also wanting rehab. Rn stating the pt is unable to move.  Awaiting PT re eval. Per RN, family was stating the pt was independent prior t

## 2018-09-10 ENCOUNTER — APPOINTMENT (OUTPATIENT)
Dept: GENERAL RADIOLOGY | Facility: HOSPITAL | Age: 78
DRG: 178 | End: 2018-09-10
Attending: HOSPITALIST
Payer: MEDICARE

## 2018-09-10 LAB
GLUCOSE BLD-MCNC: 95 MG/DL (ref 65–99)
HAV IGM SER QL: 1.8 MG/DL (ref 1.8–2.5)

## 2018-09-10 PROCEDURE — 82962 GLUCOSE BLOOD TEST: CPT

## 2018-09-10 PROCEDURE — 92523 SPEECH SOUND LANG COMPREHEN: CPT

## 2018-09-10 PROCEDURE — 83735 ASSAY OF MAGNESIUM: CPT | Performed by: HOSPITALIST

## 2018-09-10 PROCEDURE — 92526 ORAL FUNCTION THERAPY: CPT

## 2018-09-10 PROCEDURE — 74018 RADEX ABDOMEN 1 VIEW: CPT | Performed by: HOSPITALIST

## 2018-09-10 PROCEDURE — 94640 AIRWAY INHALATION TREATMENT: CPT

## 2018-09-10 PROCEDURE — 97110 THERAPEUTIC EXERCISES: CPT

## 2018-09-10 RX ORDER — BISACODYL 10 MG
10 SUPPOSITORY, RECTAL RECTAL
Status: DISCONTINUED | OUTPATIENT
Start: 2018-09-10 | End: 2018-09-11

## 2018-09-10 RX ORDER — POLYETHYLENE GLYCOL 3350 17 G/17G
17 POWDER, FOR SOLUTION ORAL DAILY PRN
Qty: 10 EACH | Refills: 0 | Status: ON HOLD | OUTPATIENT
Start: 2018-09-10 | End: 2019-01-01

## 2018-09-10 RX ORDER — MAGNESIUM OXIDE 400 MG (241.3 MG MAGNESIUM) TABLET
400 TABLET ONCE
Status: COMPLETED | OUTPATIENT
Start: 2018-09-10 | End: 2018-09-10

## 2018-09-10 RX ORDER — POLYETHYLENE GLYCOL 3350 17 G/17G
17 POWDER, FOR SOLUTION ORAL DAILY PRN
Status: DISCONTINUED | OUTPATIENT
Start: 2018-09-10 | End: 2018-09-11

## 2018-09-10 RX ORDER — SENNA AND DOCUSATE SODIUM 50; 8.6 MG/1; MG/1
2 TABLET, FILM COATED ORAL 2 TIMES DAILY
Status: DISCONTINUED | OUTPATIENT
Start: 2018-09-10 | End: 2018-09-11

## 2018-09-10 NOTE — OCCUPATIONAL THERAPY NOTE
OCCUPATIONAL THERAPY TREATMENT NOTE - INPATIENT     Room Number: 1588/4566-C  Session: 1   Number of Visits to Meet Established Goals: 5    Presenting Problem: L sided CVA 2 months ago    History related to current admission: Pt was admitted on 9/6 from [facility] Educated her about R UE PROM and issued written handout. She demonstrated understanding. Patient End of Session: In bed;Needs met;Call light within reach; All patient questions and concerns addressed    ASSESSMENT   Pt and family met goals.   Pt has been

## 2018-09-10 NOTE — PLAN OF CARE
Assumed care at 0730  Pt unable to follow commands, nonverbal  Aspiration precautions maintained  Pt tolerating nectar thick liquids and ground diet  1 soft BM  Pt turned and repositioned frequently  Awaiting DUARTE placement  Pt and family updated on POC.  Ne

## 2018-09-10 NOTE — PLAN OF CARE
Pt alert. RA.  .  NSR. Family at bedside. Pt c/o cramping to left leg. Tylenol PO for relief.  notified and Shiela Oreilly as needed. Pt briefed. Will continue to monitor.

## 2018-09-10 NOTE — SLP NOTE
SPEECH DAILY NOTE - INPATIENT    ASSESSMENT & PLAN   ASSESSMENT  Pt seen for dysphagia tx to assess tolerance of recommended diet, ensure adherence to aspiration precautions, and provide pt/family education.  Pt received sitting up in bed, being fed lunch t session(s).   Not targeted; pt unable to follow commands   Goal #4 The patient will utilize compensatory strategies as outlined by  VFSS including Slow rate, Small bites, Small sips, Upright 90 degrees, Supervision with meals with MOD assistance 80 % of the

## 2018-09-10 NOTE — PROGRESS NOTES
[facility] Hospitalist Progress Note     Aliya Garcia Patient Status:  Inpatient    1940 MRN [id number]   [facility] 7NE-A Attending Rj Dean MD   Hosp Day # 2 PCP Mahad Simon MD     CC: follow up    SUBJECTIVE:  Mr. Lisbeth torres • Pantoprazole Sodium  20 mg Oral QAM AC   • Ipratropium Bromide  0.5 mg Nebulization TID   • prednisoLONE  2.5 mg Oral Daily   • aspirin EC  325 mg Oral Daily    Or   • aspirin  300 mg Rectal Daily   • Heparin Sodium (Porcine)  5,000 Units Subcutaneous

## 2018-09-10 NOTE — CM/SW NOTE
Spoke with pt's DIL Lenoard Leyden who said they are agreeable to having pt go to [facility]. They would like him to go to [facility]. Referral made to [facility] via ecin. DON screen requested. Waiting for a response.

## 2018-09-10 NOTE — SLP NOTE
SPEECH/LANGUAGE/COGNITIVE EVALUATION - INPATIENT    Admission Date: 9/6/2018  Evaluation Date: 09/10/18    Reason for Referral: Stroke protocol    ASSESSMENT & PLAN   ASSESSMENT & IMPRESSION  Communication evaluation completed.  Per chart review, pt had a C evidence of acute intracranial process. Dictated by: Yun Khalil MD on 9/06/2018 at 20:54\"         Patient/Family Goals:  To improve communication    Interdisciplinary Communication: Discussed with RN    Patient, family and/or caregiver has been informe

## 2018-09-10 NOTE — PROGRESS NOTES
Pulmonary Progress Note        NAME: [address] [place] [address] Street: 9108/7719-A - MRN: [id number] - Age: 66year old - : 1940        Last 24hrs: No events overnight, eating more per son    OBJECTIVE:   09/10/18  0500 09/10/18  0745 09/10/18  1011 09/10/ ARTERIALPH, ARTERIALPO2, ARTERIALPCO2, ARTERIALHCO3    No results for input(s): BNP in the last 72 hours.     Invalid input(s): TROPI    INR   Date/Time Value Ref Range Status   07/30/2012 03:40 PM 1.09 0.93 - 1.26 Final     Comment:     INR Therapeutic Int

## 2018-09-11 VITALS
DIASTOLIC BLOOD PRESSURE: 63 MMHG | TEMPERATURE: 98 F | OXYGEN SATURATION: 93 % | SYSTOLIC BLOOD PRESSURE: 109 MMHG | HEART RATE: 79 BPM | RESPIRATION RATE: 18 BRPM | WEIGHT: 170 LBS | BODY MASS INDEX: 24 KG/M2

## 2018-09-11 LAB
BILIRUB UR QL STRIP.AUTO: NEGATIVE
CLARITY UR REFRACT.AUTO: CLEAR
COLOR UR AUTO: YELLOW
ERYTHROCYTE [DISTWIDTH] IN BLOOD BY AUTOMATED COUNT: 18.8 % (ref 11.5–16)
GLUCOSE BLD-MCNC: 111 MG/DL (ref 65–99)
GLUCOSE BLD-MCNC: 91 MG/DL (ref 65–99)
GLUCOSE UR STRIP.AUTO-MCNC: NEGATIVE MG/DL
HAV IGM SER QL: 2 MG/DL (ref 1.8–2.5)
HCT VFR BLD AUTO: 31.9 % (ref 37–53)
HGB BLD-MCNC: 9.9 G/DL (ref 13–17)
KETONES UR STRIP.AUTO-MCNC: NEGATIVE MG/DL
LEUKOCYTE ESTERASE UR QL STRIP.AUTO: NEGATIVE
MCH RBC QN AUTO: 28.2 PG (ref 27–33.2)
MCHC RBC AUTO-ENTMCNC: 31 G/DL (ref 31–37)
MCV RBC AUTO: 90.9 FL (ref 80–99)
NITRITE UR QL STRIP.AUTO: NEGATIVE
PH UR STRIP.AUTO: 6 [PH] (ref 4.5–8)
PLATELET # BLD AUTO: 232 10(3)UL (ref 150–450)
PROT UR STRIP.AUTO-MCNC: NEGATIVE MG/DL
RBC # BLD AUTO: 3.51 X10(6)UL (ref 3.8–5.8)
RBC UR QL AUTO: NEGATIVE
RED CELL DISTRIBUTION WIDTH-SD: 63.1 FL (ref 35.1–46.3)
SP GR UR STRIP.AUTO: 1.01 (ref 1–1.03)
UROBILINOGEN UR STRIP.AUTO-MCNC: <2 MG/DL
WBC # BLD AUTO: 7.4 X10(3) UL (ref 4–13)

## 2018-09-11 PROCEDURE — 81003 URINALYSIS AUTO W/O SCOPE: CPT | Performed by: PHYSICIAN ASSISTANT

## 2018-09-11 PROCEDURE — 85027 COMPLETE CBC AUTOMATED: CPT | Performed by: PHYSICIAN ASSISTANT

## 2018-09-11 PROCEDURE — 94640 AIRWAY INHALATION TREATMENT: CPT

## 2018-09-11 PROCEDURE — 92507 TX SP LANG VOICE COMM INDIV: CPT

## 2018-09-11 PROCEDURE — 82962 GLUCOSE BLOOD TEST: CPT

## 2018-09-11 PROCEDURE — 92526 ORAL FUNCTION THERAPY: CPT

## 2018-09-11 PROCEDURE — 97110 THERAPEUTIC EXERCISES: CPT

## 2018-09-11 PROCEDURE — 83735 ASSAY OF MAGNESIUM: CPT | Performed by: HOSPITALIST

## 2018-09-11 RX ORDER — ASPIRIN 325 MG
325 TABLET ORAL DAILY
Qty: 20 TABLET | Refills: 0 | Status: ON HOLD | COMMUNITY
Start: 2018-09-12 | End: 2019-01-01

## 2018-09-11 RX ORDER — TIZANIDINE 2 MG/1
2 TABLET ORAL EVERY 8 HOURS PRN
Qty: 20 TABLET | Refills: 0 | Status: ON HOLD | OUTPATIENT
Start: 2018-09-11 | End: 2019-01-01

## 2018-09-11 RX ORDER — ACETAMINOPHEN 325 MG/1
650 TABLET ORAL EVERY 6 HOURS PRN
Qty: 10 TABLET | Refills: 0 | Status: ON HOLD | COMMUNITY
Start: 2018-09-11 | End: 2019-01-01

## 2018-09-11 RX ORDER — ASPIRIN 325 MG
325 TABLET ORAL DAILY
Status: DISCONTINUED | OUTPATIENT
Start: 2018-09-12 | End: 2018-09-11

## 2018-09-11 RX ORDER — POLYVINYL ALCOHOL 14 MG/ML
1 SOLUTION/ DROPS OPHTHALMIC 4 TIMES DAILY PRN
Qty: 15 ML | Refills: 0 | Status: SHIPPED | OUTPATIENT
Start: 2018-09-11 | End: 2018-09-18 | Stop reason: ALTCHOICE

## 2018-09-11 RX ORDER — ATORVASTATIN CALCIUM 10 MG/1
10 TABLET, FILM COATED ORAL NIGHTLY
Qty: 30 TABLET | Refills: 0 | Status: SHIPPED | OUTPATIENT
Start: 2018-09-11 | End: 2018-01-01

## 2018-09-11 NOTE — SLP NOTE
SPEECH DAILY NOTE - INPATIENT    ASSESSMENT & PLAN   ASSESSMENT  Pt seen for dysphagia tx to assess tolerance with recommended diet, ensure proper utilization of aspiration precautions and provide pt/family education, in addition to aphasia tx.  Pt awake, a with 50 % accuracy within   3 session(s).   Progressing   Goal #2 The patient will answer Charlotte yes/no questions with 40 % accuracy within   3 session(s).     Progressing   Goal #3 The patient will recite Serialized Sentences/Sing Songs or automatic spee

## 2018-09-11 NOTE — PLAN OF CARE
Pt ready for discharge. [facility] ambulance here. Papers given to them for transport to [facility]. Family member asking to wait for other family member to be here. This family was all notified of discharge time earlier today.  Informed them once ambu

## 2018-09-11 NOTE — DISCHARGE SUMMARY
General Medicine Discharge Summary     Patient ID:  Baby Masker  66year old  1/25/1940    Admit date: 9/6/2018    Discharge date and time: 9/11/18    Attending Physician: Gogo Mora MD     [facility] PE  - off antibiotics, sats remain stable on RA  - appreciate pulmonary consult during admission     # Anemia  - Hg drop 12.2--> 9.6 with IVF without s/s of blood loss, suspect dilutional  - Hg stable at 9.9 on day of discharge     # hypoglycemia, improved 1 tablet (10 mg total) by mouth nightly. Replaces:  simvastatin 20 MG Tabs     omeprazole 2mg/ml Susp  Commonly known as:  PRILOSEC  Take 10 mL (20 mg total) by mouth daily.   Replaces:  omeprazole 20 MG Cpdr     PEG 3350 Pack  Commonly known as:  Onita Bassfield Soln  · TiZANidine HCl 2 MG Tabs     Information about where to get these medications is not yet available    Ask your nurse or doctor about these medications  · acetaminophen 325 MG Tabs  · aspirin 325 MG Tabs         Activity: activity as tolerated, isra

## 2018-09-11 NOTE — PHYSICAL THERAPY NOTE
PHYSICAL THERAPY TREATMENT NOTE - INPATIENT    Room Number: 5587/8437-X     Session: 1   Number of Visits to Meet Established Goals: 5    Presenting Problem: cough, recent CVA     History related to current admission:      Pt is 66year old male admitted Need to walk in hospital room?: Total   -   Climbing 3-5 steps with a railing?: Total       AM-PAC Score:  Raw Score: 8   PT Approx Degree of Impairment Score: 86.62%   Standardized Score (AM-PAC Scale): 28.58   CMS Modifier (G-Code): CM    FUNCTIONAL ABIL rehabilitation     PLAN  PT Treatment Plan: Bed mobility; Body mechanics; Endurance; Energy conservation;Patient education; Family education;Strengthening;Stair training;Transfer training;Balance training  Rehab Potential : Guarded  Frequency (Obs): 3x/week

## 2018-09-11 NOTE — CM/SW NOTE
[facility] accepted pt. Pt is ready for d/c today. RN to call report to [phone]. Called Kayce at [facility] to coordinate d/c time. Arranged [facility] to  pt at 5:00pm.  Spoke with son Katie Beyer to notify him of pt's d/c time.

## 2018-09-12 NOTE — CM/SW NOTE
09/12/18 0900   Discharge disposition   Expected discharge disposition Skilled Nurs   Name of Jordon Vallejo   Patient is Discharged to a [address] [place] Yes   Discharge transportation [employer]

## 2018-09-18 PROBLEM — J84.10 PULMONARY FIBROSIS (HCC): Status: ACTIVE | Noted: 2018-09-18

## 2018-09-18 PROBLEM — M05.79 RHEUMATOID ARTHRITIS INVOLVING MULTIPLE SITES WITH POSITIVE RHEUMATOID FACTOR (HCC): Status: ACTIVE | Noted: 2018-09-18

## 2018-09-18 PROBLEM — R07.9 CHEST PAIN OF UNCERTAIN ETIOLOGY: Status: RESOLVED | Noted: 2018-09-06 | Resolved: 2018-09-18

## 2018-09-19 ENCOUNTER — APPOINTMENT (OUTPATIENT)
Dept: GENERAL RADIOLOGY | Facility: HOSPITAL | Age: 78
DRG: 871 | End: 2018-09-19
Attending: EMERGENCY MEDICINE
Payer: MEDICARE

## 2018-09-19 ENCOUNTER — APPOINTMENT (OUTPATIENT)
Dept: CT IMAGING | Facility: HOSPITAL | Age: 78
DRG: 871 | End: 2018-09-19
Attending: EMERGENCY MEDICINE
Payer: MEDICARE

## 2018-09-19 ENCOUNTER — HOSPITAL ENCOUNTER (INPATIENT)
Facility: HOSPITAL | Age: 78
LOS: 20 days | Discharge: NURSING, REHAB OR LONG-TERM CARE | DRG: 871 | End: 2018-10-10
Attending: EMERGENCY MEDICINE | Admitting: INTERNAL MEDICINE
Payer: MEDICARE

## 2018-09-19 DIAGNOSIS — D64.9 ANEMIA, UNSPECIFIED TYPE: ICD-10-CM

## 2018-09-19 DIAGNOSIS — E86.0 DEHYDRATION: Primary | ICD-10-CM

## 2018-09-19 PROCEDURE — 71275 CT ANGIOGRAPHY CHEST: CPT | Performed by: EMERGENCY MEDICINE

## 2018-09-19 PROCEDURE — 74177 CT ABD & PELVIS W/CONTRAST: CPT | Performed by: EMERGENCY MEDICINE

## 2018-09-19 PROCEDURE — 71045 X-RAY EXAM CHEST 1 VIEW: CPT | Performed by: EMERGENCY MEDICINE

## 2018-09-19 RX ORDER — MAGNESIUM HYDROXIDE/ALUMINUM HYDROXICE/SIMETHICONE 120; 1200; 1200 MG/30ML; MG/30ML; MG/30ML
30 SUSPENSION ORAL 4 TIMES DAILY PRN
Status: DISCONTINUED | OUTPATIENT
Start: 2018-09-19 | End: 2018-10-10

## 2018-09-19 RX ORDER — POLYETHYLENE GLYCOL 3350 17 G/17G
17 POWDER, FOR SOLUTION ORAL DAILY PRN
Status: DISCONTINUED | OUTPATIENT
Start: 2018-09-19 | End: 2018-10-10

## 2018-09-19 RX ORDER — METOCLOPRAMIDE HYDROCHLORIDE 5 MG/ML
10 INJECTION INTRAMUSCULAR; INTRAVENOUS EVERY 8 HOURS PRN
Status: DISCONTINUED | OUTPATIENT
Start: 2018-09-19 | End: 2018-10-10

## 2018-09-19 RX ORDER — SODIUM CHLORIDE 9 MG/ML
INJECTION, SOLUTION INTRAVENOUS CONTINUOUS
Status: CANCELLED | OUTPATIENT
Start: 2018-09-19 | End: 2018-09-19

## 2018-09-19 RX ORDER — ATORVASTATIN CALCIUM 10 MG/1
10 TABLET, FILM COATED ORAL NIGHTLY
Status: DISCONTINUED | OUTPATIENT
Start: 2018-09-19 | End: 2018-10-10

## 2018-09-19 RX ORDER — ENOXAPARIN SODIUM 100 MG/ML
40 INJECTION SUBCUTANEOUS DAILY
Status: DISCONTINUED | OUTPATIENT
Start: 2018-09-19 | End: 2018-10-03

## 2018-09-19 RX ORDER — ACETAMINOPHEN 325 MG/1
650 TABLET ORAL EVERY 6 HOURS PRN
Status: DISCONTINUED | OUTPATIENT
Start: 2018-09-19 | End: 2018-10-10

## 2018-09-19 RX ORDER — ASPIRIN 325 MG
325 TABLET ORAL DAILY
Status: DISCONTINUED | OUTPATIENT
Start: 2018-09-19 | End: 2018-10-10

## 2018-09-19 RX ORDER — ONDANSETRON 2 MG/ML
4 INJECTION INTRAMUSCULAR; INTRAVENOUS EVERY 6 HOURS PRN
Status: DISCONTINUED | OUTPATIENT
Start: 2018-09-19 | End: 2018-10-10

## 2018-09-19 RX ORDER — TIZANIDINE 4 MG/1
2 TABLET ORAL EVERY 8 HOURS PRN
Status: DISCONTINUED | OUTPATIENT
Start: 2018-09-19 | End: 2018-10-10

## 2018-09-19 RX ORDER — SODIUM CHLORIDE 9 MG/ML
INJECTION, SOLUTION INTRAVENOUS ONCE
Status: COMPLETED | OUTPATIENT
Start: 2018-09-19 | End: 2018-09-19

## 2018-09-19 RX ORDER — SODIUM CHLORIDE 9 MG/ML
INJECTION, SOLUTION INTRAVENOUS CONTINUOUS
Status: DISCONTINUED | OUTPATIENT
Start: 2018-09-19 | End: 2018-09-20

## 2018-09-19 RX ORDER — PANTOPRAZOLE SODIUM 20 MG/1
20 TABLET, DELAYED RELEASE ORAL
Status: DISCONTINUED | OUTPATIENT
Start: 2018-09-19 | End: 2018-09-22 | Stop reason: ALTCHOICE

## 2018-09-19 NOTE — ED PROVIDER NOTES
Patient Seen in: [facility] Emergency Department    History   Patient presents with:  Hypotension (cardiovascular)    Stated Complaint: Hypotension     HPI    66-year-old male with past medical history of CVA with expressive aphasia and right-sided bilaterally. No Rales, no rhonchi, no wheezing, no stridor. ABDOMEN: Soft, nondistended,non tender, bowel sounds are present, no rebound, no rigidity, no guarding. no pulsatile masses.  No CVA tenderness  Rectal exam: Guaiac negative brown stool  EXTREMITI EXAM: 09/19/2018  Patient No:  [id number]  Physician:  Christiano Stubbs  YOB: 1940    Past Medical History (entered by Technologist):    Reason For Exam (entered by Technologist):     Other Notes (entered by Technologist): pt non verbal   per MISAEL COHEN    DATE OF EXAM: 09/19/2018  Patient No:  [id number]  Physician:  Laurie Palmer  YOB: 1940    Past Medical History (entered by Technologist):    Reason For Exam (entered by Technologist):     Other Notes (entered by [employer]

## 2018-09-19 NOTE — CONSULTS
[facility]    NUTRITION INITIAL ASSESSMENT    Pt does not meet malnutrition criteria. NUTRITION DIAGNOSIS/PROBLEM:    Inadequate energy intake related to dysphagia 2/2 L MCA stroke as evidenced by family reported weight loss past 6 months.     Cassandra Arriaza Mass: mild depletion body fat triceps region per visual exam.     2. Fluid Accumulation: none per visual exam.    NUTRITION PRESCRIPTION:  Calories:1750- 2100 calories/day (25-30 calories per kg)  Protein: 84-98 grams protein/day (1.2-1.4 grams protein per

## 2018-09-19 NOTE — PHYSICAL THERAPY NOTE
PHYSICAL THERAPY EVALUATION - INPATIENT     Room Number: 816/942-T  Evaluation Date: 9/19/2018  Type of Evaluation: Re-evaluation  Physician Order: PT Eval and Treat    Presenting Problem: dehydration  Reason for Therapy: Mobility Dysfunction and Dischar to stimuli  · Attention Span:  appears intact  · Following Commands:  follows one-step commands inconsistently    RANGE OF MOTION AND STRENGTH ASSESSMENT  L shoulder flexion, elbow flexion/extension, wrist flexion/extension AAROM WFL  L hand  strength semi-supine in bed and non-speaking. Son and daughter present. Pt was assessed for AAROM and PROM of B UEs and LEs x3 at each joint noted above. He performed log roll with active reaching of L UE at mod A.  He transferred to sitting at EOB from R sidelying rehabilitation(ELOS 13-16 days)    PLAN  PT Treatment Plan: Bed mobility; Family education;Patient education;Range of motion;Strengthening;Transfer training;Neuromuscular re-educate  Rehab Potential : Fair  Frequency (Obs): 5x/week  Number of Visits to Meet

## 2018-09-19 NOTE — PROGRESS NOTES
NURSING ADMISSION NOTE      Patient admitted via Cart  Oriented to room. Safety precautions initiated. Bed in low position. Call light in reach.     Received pt from ED. BP's low 70s/50s, IVF infusing, otherwise afebrile, RR wnl, with productive coug

## 2018-09-19 NOTE — PLAN OF CARE
Problem: Impaired Functional Mobility  Goal: Achieve highest/safest level of mobility/gait  Interventions:  - Assess patient's functional ability and stability  - Promote increasing activity/tolerance for mobility  - Educate and engage patient/family in to

## 2018-09-19 NOTE — H&P
[facility] Patient Status:  Observation    1940 MRN [id number]   [facility] 5NW-A Attending Renetta Freeman, DO   Hosp Day # 0 PCP Cheryl Coyle MD     Cc: hypotension    History of Pres total) by mouth nightly. Disp: 30 tablet Rfl: 0 9/18/2018 at 2000   methotrexate 10 MG Oral Tab Take 1 tablet (10 mg total) by mouth once a week.  Disp: 4 tablet Rfl: 0 Past Week at Unknown time   TiZANidine HCl 2 MG Oral Tab Take 1 tablet (2 mg total) by m and RLE contractures with limited movement        Data Review:     Recent Labs   Lab  09/19/18   0102  09/19/18   0640   WBC  7.2  7.1   HGB  8.6*  8.4*   MCV  92.5  91.1   PLT  220.0  190.0       Recent Labs   Lab  09/19/18 0102 09/19/18   0640   NA  1 pulm toilet, RT    # anemia  - Hg noted at 8.4 without s/s of blood loss  - continue to follow CBC    # RA  - on weekly MTX and chronic prednisone  - hold MTX at present    # dysphagia  - SLP eval  - previously on mechanical ground with nectar thick liquid

## 2018-09-19 NOTE — PLAN OF CARE
Impaired Swallowing    • Minimize aspiration risk Progressing            Multidisciplinary Discharge Rounds held 9/19/2018.     Treatment team members present today include , , Charge Nurse,  Nurse, RT, PT and Pharmacy caring for [facility]

## 2018-09-19 NOTE — SLP NOTE
ADULT SWALLOWING EVALUATION    ASSESSMENT    ASSESSMENT/OVERALL IMPRESSION:  Pt seen this AM for bedside swallow evaluation. RN approved session. Pt cooperative, pleasant, and alert. No family or caregivers present at bedside.  Author of this note spoke to straws; Alternate consistencies;Small bites and sips; Extra sauce/gravy  Aspiration Precautions: Upright position; Slow rate;Small bites and sips; No straw;Cueing to swallow  Medication Administration Recommendations: Crushed in puree  Treatment Plan/Recommend Impaired  Mastication: Impaired  Retention: Impaired    Pharyngeal Phase of Swallow: Impaired  Laryngeal Elevation Timing: Appears impaired  Laryngeal Elevation Strength: Appears impaired  Laryngeal Elevation Coordination: Appears impaired  (Please note: S

## 2018-09-19 NOTE — ED INITIAL ASSESSMENT (HPI)
RN at nursing home took pts bp with a reading of 70 systolic. EMS notified, rechecked pts bp, SBP 70's. Patient given 800ml bolus and SBP now 80's.

## 2018-09-19 NOTE — CM/SW NOTE
Met with pt and pt's dtr, Sandra Albright at bedside. Pt had a recent hospital admission 9/6-9/11 and was discharged to [facility] in [facility]. Pt's dtr stated that pt had been very independent, traveling then had a CVA when in [facility].   They brought pt home for me

## 2018-09-19 NOTE — CONSULTS
Pulmonary / Critical Care H&P/Consult       NAME: [address] [place] [address] Street: 672/464-C - MRN: [id number] - Age: 66year old - :  1940    Date of Admission: 2018 12:35 AM  Admission Diagnosis: Dehydration [E86.0]  Anemia, unspecified type [D64. 9 (650 mg total) by mouth every 6 (six) hours as needed for Pain. Disp: 10 tablet Rfl: 0   aspirin 325 MG Oral Tab Take 1 tablet (325 mg total) by mouth daily. Disp: 20 tablet Rfl: 0   atorvastatin 10 MG Oral Tab Take 1 tablet (10 mg total) by mouth nightly. the 24 hours ending 09/19/18 1315    BP (!) 80/63 (BP Location: Left arm)   Pulse 74   Temp 98.2 °F (36.8 °C) (Axillary)   Resp 18   Ht 5' 10\" (1.778 m)   Wt 154 lb 3.2 oz (69.9 kg)   SpO2 96%   BMI 22.13 kg/m²     General Appearance:    Awake, nonverbal infection  2. Aspiration pneumonitis:   - seen by speech with modified diet ordered  - hold on abx  3. 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 O2 as needed.      For now will fol

## 2018-09-19 NOTE — PROGRESS NOTES
09/19/18 0661   Provider Notification   Reason for Communication Critical value  (BP 70s/50s)   Provider Name Other (comment)  Benny Douglas   Method of Communication Page   Response Phone call; No new orders   Notification Time 0840   Received pt from ED.  BP

## 2018-09-20 ENCOUNTER — APPOINTMENT (OUTPATIENT)
Dept: CV DIAGNOSTICS | Facility: HOSPITAL | Age: 78
DRG: 871 | End: 2018-09-20
Attending: HOSPITALIST
Payer: MEDICARE

## 2018-09-20 ENCOUNTER — APPOINTMENT (OUTPATIENT)
Dept: GENERAL RADIOLOGY | Facility: HOSPITAL | Age: 78
DRG: 871 | End: 2018-09-20
Attending: HOSPITALIST
Payer: MEDICARE

## 2018-09-20 PROBLEM — I95.9 SEVERE HYPOTENSION: Status: ACTIVE | Noted: 2018-09-20

## 2018-09-20 PROCEDURE — 71045 X-RAY EXAM CHEST 1 VIEW: CPT | Performed by: HOSPITALIST

## 2018-09-20 PROCEDURE — 93306 TTE W/DOPPLER COMPLETE: CPT | Performed by: HOSPITALIST

## 2018-09-20 RX ORDER — POTASSIUM CHLORIDE 20 MEQ/1
40 TABLET, EXTENDED RELEASE ORAL EVERY 4 HOURS
Status: DISPENSED | OUTPATIENT
Start: 2018-09-20 | End: 2018-09-20

## 2018-09-20 RX ORDER — SODIUM CHLORIDE 9 MG/ML
INJECTION, SOLUTION INTRAVENOUS ONCE
Status: COMPLETED | OUTPATIENT
Start: 2018-09-20 | End: 2018-09-20

## 2018-09-20 NOTE — PROGRESS NOTES
[facility] Hospitalist note    PCP: Elizabeth Benítez MD    Chief Complaint:  67 yo man with h/o CVA, pulm fibrosis, who presented with hypotension    SUBJECTIVE:  Pt was more alert this morning, niece at bedside.  Also came back and discussed care with pt's son o 0. 85  0.66*   --    CA  7.8*  7.6*   --    GLU  89  89   --        Recent Labs   Lab  09/19/18   0102   ALT  11*   AST  17   ALB  1.8*       No results for input(s): PGLU in the last 168 hours. No results for input(s): TROP in the last 168 hours.       Holly Rodríguez

## 2018-09-20 NOTE — PROGRESS NOTES
Pts BP in the 60s, O2 needs increasing from 2L to 5L to maintain O2 saturations >90%. MD paged, Labs ordered, Stat CXR ordered, 1L bolus ordered. WCTM patient at this time.  Possible transfer to ICU if BP doesn't respond to fluid bolus

## 2018-09-20 NOTE — OCCUPATIONAL THERAPY NOTE
Attempted to see pt this PM, pt not approp for therapy per RN, pt with drop in BP and increased O2 needs, OT will follow up tomorrow.

## 2018-09-20 NOTE — PROGRESS NOTES
Pulmonary Progress Note      NAME: Zoltan Talavera - ROOM: [address] [place] [address] - MRN: [id number] - Age: 66year old - : 1940    Assessment/Plan:    1.  Hypotension - hypovolemia vs sepsis vs relative adrenal insufficiency  - IVF, has responded appropriately (Oral)   Resp 16   Ht 5' 10\" (1.778 m)   Wt 154 lb 3.2 oz (69.9 kg)   SpO2 97%   BMI 22.13 kg/m²   Physical Exam:   General: sleepy, cooperative, no respiratory distress. HEENT: Normocephalic atraumatic. Lips, mucosa, and tongue normal.  No thrush noted.

## 2018-09-20 NOTE — PROGRESS NOTES
09/20/18 1233   Clinical Encounter Type   Visited With Patient and family together   Sacramental Encounters   Sacrament of Sick-Anointing Anointed   The patient was seen by Katy Madera.  Received prayer, Scripture, support and Sacrament of t

## 2018-09-20 NOTE — PLAN OF CARE
Impaired Functional Mobility    • Achieve highest/safest level of mobility/gait Progressing        Impaired Swallowing    • Minimize aspiration risk Progressing        WAYNE orientation but alert, expressive aphasia, hx CVA with right sided weakness.  No dist

## 2018-09-21 ENCOUNTER — APPOINTMENT (OUTPATIENT)
Dept: GENERAL RADIOLOGY | Facility: HOSPITAL | Age: 78
DRG: 871 | End: 2018-09-21
Attending: INTERNAL MEDICINE
Payer: MEDICARE

## 2018-09-21 PROCEDURE — 71045 X-RAY EXAM CHEST 1 VIEW: CPT | Performed by: INTERNAL MEDICINE

## 2018-09-21 RX ORDER — SODIUM CHLORIDE 9 MG/ML
INJECTION, SOLUTION INTRAVENOUS CONTINUOUS
Status: DISCONTINUED | OUTPATIENT
Start: 2018-09-21 | End: 2018-09-21

## 2018-09-21 NOTE — PROGRESS NOTES
Pulmonary Progress Note        NAME: [address] [place] [address] Street: 604/674-F - MRN: [id number] - Age: 66year old - : 1940        Last 24hrs: No events overnight, appears better than yesterday per family and nursing report but remains not much of a con CO2  27.0  25.0   --    --    BUN  14  11   --    --    CA  7.8*  7.6*   --    --        Recent Labs      09/19/18   0102   ALT  11*   AST  17   ALB  1.8*       Invalid input(s): ARTERIALPH, ARTERIALPO2, ARTERIALPCO2, ARTERIALHCO3    No results for input weaned off O2 at rest  - seen by speech with modified diet ordered  3. 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 O2 as needed.    4. Dispo - full code             Amarjit Jean

## 2018-09-21 NOTE — PLAN OF CARE
Impaired Functional Mobility    • Achieve highest/safest level of mobility/gait Progressing        Impaired Swallowing    • Minimize aspiration risk Progressing        WAYNE orientation but alert, hx of CVA, pt requiring 5L/min per nasal cannula to maintain

## 2018-09-21 NOTE — PROGRESS NOTES
[facility] Hospitalist note    PCP: Elizabeth Benítez MD    Chief Complaint:  65 yo man with h/o CVA, pulm fibrosis, who presented with hypotension    SUBJECTIVE:  Awake and sitting in chair, being fed by daughter.    More alert than yesterday  BP controlled    OB atorvastatin  10 mg Oral Nightly   • Sertraline HCl  50 mg Oral Daily   • Pantoprazole Sodium  20 mg Oral Before breakfast     • sodium chloride 100 mL/hr at 09/21/18 0925     acetaminophen, ondansetron HCl, Metoclopramide HCl, Ipratropium Bromide, TiZANid

## 2018-09-21 NOTE — PHYSICAL THERAPY NOTE
PHYSICAL THERAPY TREATMENT NOTE - INPATIENT    Room Number: 948/927-U     Session: 1   Number of Visits to Meet Established Goals: 5    Presenting Problem: dehydration  Reason for Therapy: Mobility Dysfunction and Discharge Planning     History related to Pressure: 397/81    AM-PAC '6-Clicks' INPATIENT SHORT FORM - BASIC MOBILITY  How much difficulty does the patient currently have. ..  -   Turning over in bed (including adjusting bedclothes, sheets and blankets)?: A Lot   -   Sitting down on and standing up O2 sats unable to measure during transfer due to short line. RN notified. DISCHARGE RECOMMENDATIONS  PT Discharge Recommendations: Sub-acute rehabilitation(ELOS 13-16 days)     PLAN  PT Treatment Plan: Bed mobility; Family education;Patient education;Ran

## 2018-09-21 NOTE — OCCUPATIONAL THERAPY NOTE
OCCUPATIONAL THERAPY EVALUATION - INPATIENT     Room Number: 170/769-C  Evaluation Date: 9/21/2018  Type of Evaluation: Initial  Presenting Problem: dehydration, CVA in May 2018    Physician Order: IP Consult to Occupational Therapy  Reason for Therapy: AD Unable to rate  Location: unknown       COGNITION  Overall Cognitive Status:  WAYNE - unable to assess    VISION  Current Vision: unable to assess    PERCEPTION  Overall Perception Status:   Impaired and no response to pain on right side    SENSATION  impair therapist completed MMT and ROM on pt, pt was able to assist with sit to stand with sit to stand lift, therapist assist pt to complete t/f to chair with sit to stand lift, assess tone, provide PROM of RUE. Patient End of Session: Up in chair;Needs met; Rafael eval/education;Patient/Family training;Patient/Family education;Cognitive reorientation; Endurance training;UE strengthening/ROM; Functional transfer training;Visual perceptual training  Rehab Potential : Good  Frequency (Obs): 5x/week  Number of Visits to [facility]

## 2018-09-22 RX ORDER — POTASSIUM CHLORIDE 20 MEQ/1
40 TABLET, EXTENDED RELEASE ORAL EVERY 4 HOURS
Status: COMPLETED | OUTPATIENT
Start: 2018-09-22 | End: 2018-09-22

## 2018-09-22 RX ORDER — LIDOCAINE 50 MG/G
1 PATCH TOPICAL DAILY PRN
Status: DISCONTINUED | OUTPATIENT
Start: 2018-09-22 | End: 2018-10-02

## 2018-09-22 NOTE — PLAN OF CARE
PATIENT ALERT/AWAKE. HIS EYES ARE OPEN AND HE TRACKS PEOPLE IN HIS ROOM. VSS. HE LOOKS COMFORTABLE. HE REMAINS APHASIC. SATURATING  ABOVE 92% ON ROOM AIR. HE HAS FAMILY IN HIS ROOM WHO HE SEEM TO RECOGNIZE. HIS RIGHT SIDE IS WEAK.   Kaye Liter

## 2018-09-22 NOTE — PROGRESS NOTES
[facility] hospitalist daily note  Patient was seen/examined on 9/22/18    S; family at bedside. Patient essentially nonverbal since his stroke.  He at times complaints of left leg pain    Medications in Epic    PE;   09/22/18  1100   BP: 110/56   Pulse: 59   Resp hemoglobin improved from 9/19/19    8) Hypernatremia; 0.9NS stopped.   Monitor BMP    9) hypokalemia replace per protocol     9) prophy- lovenox      Dr. Deandra Grace will see this patient on 9/23/18    Kellen Linn MD  [facility] hospitalist  [phone]

## 2018-09-22 NOTE — PROGRESS NOTES
Pulmonary Progress Note        NAME: [address] [place] [address] Street: 467/677-P - MRN: [id number] - Age: 66year old - : 1940    Past Medical History:  No date: Arthritis  No date: Prediabetes  No date: Pulmonary fibrosis ([facility]  2018: Stroke ([facility] 14  11   --    --   9   CREATSERUM  0.85  0.66*   --    --   0.73   GFRAA  96  107   --    --   103   GFRNAA  83  93   --    --   89   CA  7.8*  7.6*   --    --   7.9*   NA  138  137   --    --   145*   K  3.7  3.6  3.5*  4.0  3.3*   CL  104  107   --

## 2018-09-23 NOTE — PLAN OF CARE
Impaired Activities of Daily Living    • Achieve highest/safest level of independence in self care Not Progressing        Impaired Functional Mobility    • Achieve highest/safest level of mobility/gait Not Progressing          Impaired Swallowing    • Mini

## 2018-09-23 NOTE — PROGRESS NOTES
[facility] Hospitalist Progress Note                                                                   Mora BROWN 2.  1/25/1940    SUBJECTIVE: pt doing well.  No able to communicate but family [phone]    Assessment/Plan:  Principal Problem:    Dehydration  Active Problems:    Anemia, unspecified type    Severe hypotension      1) Hypotension- unclear etiology but improved now.    Concern for sepsis despite neg lactic acids, procal. cxr may be worse

## 2018-09-23 NOTE — CM/SW NOTE
Met with pt, pt's son and DIL to discuss discharge planning. Spoke at length regarding DC planning concerns. Discussed options for returning to [facility] vs considering another facility for rehab.   After discussion, pt's family would like pt to return to

## 2018-09-23 NOTE — PROGRESS NOTES
Mora BROWN 2. Patient Status:  Inpatient    1940 MRN [id number]   [facility] 5NW-A Attending Bob Franco DO   Hosp Day # 3 PCP Primo Andres MD     SUBJECTIVE: no new events. No cough/phlegm, fevers. normal; no masses,  no organomegaly  Extremities: extremities normal, atraumatic, no cyanosis or edema        Lab Results   Component Value Date    K 3.9 09/22/2018     Lab Results   Component Value Date    PT 13.8 07/30/2012    INR 1.09 07/30/2012

## 2018-09-24 ENCOUNTER — APPOINTMENT (OUTPATIENT)
Dept: CT IMAGING | Facility: HOSPITAL | Age: 78
DRG: 871 | End: 2018-09-24
Attending: INTERNAL MEDICINE
Payer: MEDICARE

## 2018-09-24 PROCEDURE — 71275 CT ANGIOGRAPHY CHEST: CPT | Performed by: INTERNAL MEDICINE

## 2018-09-24 PROCEDURE — 74175 CTA ABDOMEN W/CONTRAST: CPT | Performed by: INTERNAL MEDICINE

## 2018-09-24 RX ORDER — MIDODRINE HYDROCHLORIDE 10 MG/1
10 TABLET ORAL 3 TIMES DAILY
Status: DISCONTINUED | OUTPATIENT
Start: 2018-09-24 | End: 2018-09-28

## 2018-09-24 RX ORDER — POTASSIUM CHLORIDE 20 MEQ/1
40 TABLET, EXTENDED RELEASE ORAL EVERY 4 HOURS
Status: COMPLETED | OUTPATIENT
Start: 2018-09-24 | End: 2018-09-25

## 2018-09-24 NOTE — PROGRESS NOTES
[facility] Hospitalist Progress Note                                                                   Mora BROWN 2.  1/25/1940    SUBJECTIVE: nonverbal. No family at bedside.        OBJECTIVE: Hypotension- unclear etiology but improved now.    - started IV hydrocortisone in case of relative adrenal insufficiency -- weaned and changed to oral dose today  - He has been on low dose prednisone but could be more adrenally insufficient in setting of se

## 2018-09-24 NOTE — OCCUPATIONAL THERAPY NOTE
OCCUPATIONAL THERAPY TREATMENT NOTE - INPATIENT     Room Number: 085/835-D  Session: 1   Number of Visits to Meet Established Goals: 5    Presenting Problem: dehydration, CVA in May 2018    History related to current admission:   Pt was admitted from [facility] washing, rinsing, drying)?: Total  -   Toileting, which includes using toilet, bedpan or urinal? : Total  -   Putting on and taking off regular upper body clothing?: Total  -   Taking care of personal grooming such as brushing teeth?: Total  -   Eating heather rehabilitation patient should achieve assist of one level in ADL and transfers. OT Discharge Recommendations: Sub-acute rehabilitation(ELOS 13-16 days)  OT Device Recommendations: TBD    PLAN  OT Treatment Plan: Balance activities; Energy conservatio

## 2018-09-24 NOTE — PHYSICAL THERAPY NOTE
PHYSICAL THERAPY TREATMENT NOTE - INPATIENT    Room Number: 585/179-D     Session: 2/5   Number of Visits to Meet Established Goals: 5    Presenting Problem: dehydration    History related to current admission:  Pt was admitted from [place] [address] on 9/19/2018 with TOLERANCE  No shortness of breath    AM-PAC '6-Clicks' INPATIENT SHORT FORM - BASIC MOBILITY  How much difficulty does the patient currently have. ..  -   Turning over in bed (including adjusting bedclothes, sheets and blankets)?: A Lot   -   Sitting down o standing x45\" and then returned to sitting c max Ax2. Returned to supine c total assist x2. RN made aware of session and rec RN staff us sit/stand lift for t/f. Family remained at bedside.      THERAPEUTIC EXERCISES  Lower Extremity Ankle pumps  Knee exten

## 2018-09-24 NOTE — PLAN OF CARE
Impaired Functional Mobility    • Achieve highest/safest level of mobility/gait Not Progressing          Impaired Activities of Daily Living    • Achieve highest/safest level of independence in self care Progressing        Impaired Swallowing    • Minimize

## 2018-09-24 NOTE — DIETARY NOTE
[facility]     NUTRITION INITIAL ASSESSMENT     Pt does not meet malnutrition criteria.     NUTRITION DIAGNOSIS/PROBLEM:     Inadequate energy intake related to dysphagia 2/2 L MCA stroke as evidenced by family reported weight loss past 6 months.    index is 22.13 kg/m².   IBW: 75 kg     WEIGHT HISTORY:   Wt Readings from Last 30 Encounters:  09/19/18 : 69.9 kg (154 lb 3.2 oz)  09/06/18 : 77.1 kg (170 lb)  07/21/16 : 81.6 kg (180 lb)     NUTRITION:  Diet: Mechanical soft, nectar thick liquid consistenc

## 2018-09-24 NOTE — PROGRESS NOTES
Pulmonary Progress Note        NAME: [address] [place] [address] Street: 124/054-P - MRN: [id number] - Age: 66year old - : 1940        Last 24hrs: Called back for hypotension has had minimal response to IVF thus far, clinically c/o left leg pain, denies oth hours.    Invalid input(s): ALPHOS, TBIL, DBIL, TPROT    Invalid input(s): ARTERIALPH, ARTERIALPO2, ARTERIALPCO2, ARTERIALHCO3    No results for input(s): BNP in the last 72 hours.     Invalid input(s): TROPI    INR   Date/Time Value Ref Range Status   07/3 appeance  - given age and comorbidities, would be conservative here. O2 as needed. 5. Endo - cortisol low at 9. Now on stress steroids  6.  Dispo - full code  -will follow peripherally, please call with questions.         Corona Davis  [facility] Pulmonary and

## 2018-09-25 PROBLEM — Z51.5 PALLIATIVE CARE ENCOUNTER: Status: ACTIVE | Noted: 2018-09-25

## 2018-09-25 PROBLEM — Z71.89 GOALS OF CARE, COUNSELING/DISCUSSION: Status: ACTIVE | Noted: 2018-09-25

## 2018-09-25 NOTE — PROGRESS NOTES
Mora BROWN 2. Patient Status:  Inpatient    1940 MRN [id number]   [facility] 5NW-A Attending Flor Foley, DO   Hosp Day # 5 PCP Emil Bermudez MD     SUBJECTIVE: Pt with episode of hypotension earlier today to auscultation bilaterally, no wheezes or crackles                           Chest wall: No tenderness or deformity.                           DXGKQ: UJBIRRT rate and rhythm, normal S1S2, no murmur.                          Abdomen: soft, non-tender, non-d Compared to the previous CT scan of the chest of 9/19/2018, there is stable moderate hilar and mediastinal lymphadenopathy. There is stable severe pulmonary fibrosis with honeycombing.   There is slight decrease in the compressive atelectasis versus   infi

## 2018-09-25 NOTE — PLAN OF CARE
Impaired Swallowing    • Minimize aspiration risk Progressing        Patient/Family Goals    • Patient/Family Short Term Goal Progressing        Pt non-verbal but alert, right sided weakness- hx of CVA, no distress noted. Pt O2sat above 90% on room air.  Sc

## 2018-09-25 NOTE — PROGRESS NOTES
[facility] Hospitalist Progress Note                                                                   Mora BROWN 2.  1/25/1940    SUBJECTIVE: nonverbal. Daughter at bedside.  Pts BP dropped ag 10 mg Oral Nightly   • Sertraline HCl  50 mg Oral Daily     Continuous Infusions:   PRN: lidocaine, acetaminophen, ondansetron HCl, Metoclopramide HCl, Ipratropium Bromide, TiZANidine HCl, Alum & Mag Hydroxide-Simeth, PEG 3350    Assessment/Plan:  Princip

## 2018-09-25 NOTE — OCCUPATIONAL THERAPY NOTE
OCCUPATIONAL THERAPY TREATMENT NOTE - INPATIENT     Room Number: 619/558-W  Session: 2   Number of Visits to Meet Established Goals: 5    Presenting Problem: dehydration, CVA in May 2018    History related to current admission: Pt was admitted from [facility] on Toileting, which includes using toilet, bedpan or urinal? : Total  -   Putting on and taking off regular upper body clothing?: Total  -   Taking care of personal grooming such as brushing teeth?: Total  -   Eating meals?: Total    AM-PAC Score:  Score: 6 Recommendations: Sub-acute rehabilitation(ELOS 13-16 days)  OT Device Recommendations: TBD    PLAN  OT Treatment Plan: Balance activities; Energy conservation/work simplification techniques;ADL training;IADL training;Continued evaluation; Compensatory techni

## 2018-09-25 NOTE — PROGRESS NOTES
Informed by pt daughter that the pt 'does not look well;' no fever, is diaphoretic; BP checked, noted 66/46 after recheck; MD paged, order to transfer to ICU    Given 250 mL bolus while waiting for bed per MD, BP up to 88/68; will monitor

## 2018-09-25 NOTE — PHYSICAL THERAPY NOTE
PHYSICAL THERAPY TREATMENT NOTE - INPATIENT    Room Number: 662/419-V     Session: 3   Number of Visits to Meet Established Goals: 5    Presenting Problem: dehydration    History related to current admission:  Pt was admitted from [place] [address] on 9/19/2018 with de Standing: Not tested  Dynamic Standing: Not tested    ACTIVITY TOLERANCE  O2 Saturation: 95%  Room air  No shortness of breath  Blood Pressure: Supine 96/67, Sitting 60/43 and 64/49 seated, 81/60 in sup at end of session    AM-PAC '6-Clicks' INPATIENT SHOR Pt unable to verbalize pain or dizziness. Pt following commands intermittently. Rn to provide offloading boots for pt while in bed as pt demos discomfort in R heel with WB.   Pt will cont to benefit from skilled IP PT services in order to maximize functio

## 2018-09-25 NOTE — PLAN OF CARE
Impaired Functional Mobility    • Achieve highest/safest level of mobility/gait Progressing          Impaired Swallowing    • Minimize aspiration risk Progressing          PROBLEM: Dehydration, hypotension    ASSESSMENT: Pt is awake and alert but is nonver

## 2018-09-25 NOTE — CONSULTS
Diann Hampton 40  [id number]  Hospital Day #5  Date of Consult: 09/25/18       Reason for Consultation: Consult requested for evaluation of palliative care needs and goals of care discussion. care.  Thank you for allowing the Palliative Care Team to participate in the care of your patient. We will continue to follow.     CELESTE Chong  Palliative Care Nurse Practitioner  Pager [phone], Phone [phone]  9/25/2018  10:53 AM

## 2018-09-25 NOTE — CONSULTS
[facility]      Endocrinology Consultation    Shell Rios Patient Status:  Inpatient    1940 MRN [id number]   [facility] 5NW-A Attending Ashley Leach, [address] [facility] Day # 5 PCP Charles Azevedo MD     Reason for Consultation: tablet Rfl: 0 9/18/2018 at Unknown time   omeprazole 2mg/ml Oral Suspension Take 10 mL (20 mg total) by mouth daily. Disp: 90 mL Rfl: 0 9/18/2018 at Unknown time   PEG 3350 Oral Powd Pack Take 17 g by mouth daily as needed (constipation).  Must be dissolved injection 10 mg, 10 mg, Intravenous, Q8H PRN  •  atorvastatin (LIPITOR) tab 10 mg, 10 mg, Oral, Nightly  •  Ipratropium Bromide (ATROVENT) 0.02 % nebulizer solution 0.5 mg, 0.5 mg, Nebulization, PRN  •  Sertraline HCl (ZOLOFT) tab 50 mg, 50 mg, Oral, Daily TSH Latest Ref Range: 0.350 - 5.500 mIU/mL 1.780 3.290 3.230 1.600    CORTISOL Latest Units: ug/dL     9.6     CTA 9/24  PROCEDURE:  CTA CHEST + CTA ABDOMEN DISSECT SET(CPT=71275/48545)     COMPARISON:  [facility] , CT ANGIOGRAPHY, CHEST (CPT=71275), 9/19 axillary adenopathy. THORACIC AORTA:  Marked vasculopathy. Multiple irregular plaques involve the transverse and proximal to mid descending aorta without aneurysm. VASCULATURE:  The exam is limited due to breathing artifact.   The main pulmonary arteri =====  CONCLUSION:    1. Compared to the previous CT scan of the chest of 9/19/2018, there is stable moderate hilar and mediastinal lymphadenopathy. There is stable severe pulmonary fibrosis with honeycombing.   There is slight decrease in the compressi Ashley Villaseñor MD  Endocrinology, Diabetes, and Metabolism  [facility]

## 2018-09-25 NOTE — PROGRESS NOTES
Received pt from the floor. Son at bedside. Pt awake and alert. BP stable at this time, will continue to monitor. See flowsheets.

## 2018-09-26 PROCEDURE — 99231 SBSQ HOSP IP/OBS SF/LOW 25: CPT | Performed by: CLINICAL NURSE SPECIALIST

## 2018-09-26 RX ORDER — SERTRALINE HYDROCHLORIDE 25 MG/1
25 TABLET, FILM COATED ORAL DAILY
Status: DISCONTINUED | OUTPATIENT
Start: 2018-09-27 | End: 2018-10-10

## 2018-09-26 RX ORDER — POTASSIUM CHLORIDE 20 MEQ/1
40 TABLET, EXTENDED RELEASE ORAL EVERY 4 HOURS
Status: COMPLETED | OUTPATIENT
Start: 2018-09-26 | End: 2018-09-26

## 2018-09-26 NOTE — PLAN OF CARE
Impaired Activities of Daily Living    • Achieve highest/safest level of independence in self care Progressing          Impaired Functional Mobility    • Achieve highest/safest level of mobility/gait Progressing          Impaired Swallowing    • Minimize a

## 2018-09-26 NOTE — PHYSICAL THERAPY NOTE
Physical Therapy    Pt w/ transfer to ICU on 9/25/18 due to hypotension. Per rn, blood pressure remains labile at this time. Pt will require new orders for PT/OT when medically stable to resume therapy sessions.

## 2018-09-26 NOTE — PLAN OF CARE
Achieve highest/safest level of independence in self care Not Progressing    Pt max assist with ADL's, turning in bed, feeding. Achieve highest/safest level of mobility/gait Not Progressing    Pt unable to get out of bed this am d/t hypotension.   Minimize

## 2018-09-26 NOTE — SEPSIS REASSESSMENT
[facility]      Sepsis Reassessment Note    /75   Pulse 70   Temp 98.9 °F (37.2 °C) (Temporal)   Resp 23   Ht 177.8 cm (5' 10\")   Wt 170 lb 9.6 oz (77.4 kg)   SpO2 95%   BMI 24.48 kg/m²      Cardiac:  Regularity: Regular  Rate: Normal  Heart

## 2018-09-26 NOTE — PROGRESS NOTES
[facility]  Endocrinology Progress Note    Edgar Boles Patient Status:  Inpatient    1940 MRN [id number]   [facility] 4SW-A Attending Mariya Salas, [address] Day # 6 PCP Karyle Alice, MD     Subjective:  Non-verbal. Tash Diamond dissection     TECHNIQUE:  After obtaining the patient's consent, CT images were created without and with non-ionic intravenous contrast material. 3D MIP post processing was performed on the CTA Chest and Abdomen. Dose reduction techniques were used.  Dose There is a stable 0.6 cm cyst in the right lobe of the liver. BILIARY:  Status post cholecystectomy. No biliary tree dilation. PANCREAS:  No lesion, fluid collection, ductal dilatation, or atrophy. SPLEEN:  No enlargement or focal lesion.     KIDNEYS: aorta through the bifurcation. 2 of numerous plaques are noted especially in the transverse and proximal descending thoracic aorta and the common iliac arteries. The maximum transverse dimension of the abdominal aorta   is 2.5 cm, unchanged.   There is a

## 2018-09-26 NOTE — PROGRESS NOTES
[facility] Hospitalist Progress Note                                                                   Mora BROWN 2.  1/25/1940    SUBJECTIVE:  Mr. Kaz Bautista opens eyes, makes eye contact.  Noted mg Oral Daily     Assessment/Plan:    Hypotension  - unclear etiology  - PCT negative, antibiotic course completed  - adrenal insufficiency less likely as no significant response with IV steroids, tapering steroids per endo  - echo LVEF 65%, trop negative taper sertraline  - consider autonomic dysfunction  - requiring IV pressors for BP support    ICU    Reynold Christine DO  [facility] Hospitalist  [phone]

## 2018-09-26 NOTE — PROGRESS NOTES
Pulmonary Progress Note        NAME: Lacey Avendano - ROOM: 119/881-V - MRN: [id number] - Age: 66year old - : 1940        Last 24hrs: No events overnight, remains periodically hypotensive, per his daughter at bedside the pt has been having issu 7.9*       Recent Labs      09/24/18   1459   ALT  14*   AST  14*   ALB  1.7*       Invalid input(s): ARTERIALPH, ARTERIALPO2, ARTERIALPCO2, ARTERIALHCO3    No results for input(s): BNP in the last 72 hours.     Invalid input(s): TROPI    INR   Date/Time Va comorbidities, would be conservative here. O2 as needed. 4. H/o CVA  -minimally functional at this time  -cont PT/OT/Speech  5. FEN  -diet per speech recs  -monitor lytes, replace as needed  6. Proph  -heparin  7.  Dispo - full code  -palliative to address

## 2018-09-26 NOTE — OCCUPATIONAL THERAPY NOTE
Patient transferred to ICU on 9/25/18 d/t hypotension. Will require new orders to resume PT/OT when medically appropriate.

## 2018-09-27 PROCEDURE — 99232 SBSQ HOSP IP/OBS MODERATE 35: CPT | Performed by: CLINICAL NURSE SPECIALIST

## 2018-09-27 RX ORDER — BISACODYL 10 MG
10 SUPPOSITORY, RECTAL RECTAL
Status: DISCONTINUED | OUTPATIENT
Start: 2018-09-27 | End: 2018-10-10

## 2018-09-27 RX ORDER — MELATONIN
325 2 TIMES DAILY WITH MEALS
Status: DISCONTINUED | OUTPATIENT
Start: 2018-09-27 | End: 2018-10-10

## 2018-09-27 RX ORDER — MAGNESIUM SULFATE HEPTAHYDRATE 40 MG/ML
2 INJECTION, SOLUTION INTRAVENOUS ONCE
Status: COMPLETED | OUTPATIENT
Start: 2018-09-27 | End: 2018-09-27

## 2018-09-27 RX ORDER — MELATONIN
1000 DAILY
Status: DISCONTINUED | OUTPATIENT
Start: 2018-09-27 | End: 2018-10-10

## 2018-09-27 RX ORDER — MULTIVIT WITH MINERALS/LUTEIN
250 TABLET ORAL DAILY
Status: DISCONTINUED | OUTPATIENT
Start: 2018-09-27 | End: 2018-10-10

## 2018-09-27 RX ORDER — FOLIC ACID 1 MG/1
1 TABLET ORAL DAILY
Status: DISCONTINUED | OUTPATIENT
Start: 2018-09-27 | End: 2018-10-10

## 2018-09-27 RX ORDER — SODIUM CHLORIDE 9 MG/ML
INJECTION, SOLUTION INTRAVENOUS CONTINUOUS
Status: DISCONTINUED | OUTPATIENT
Start: 2018-09-27 | End: 2018-09-27

## 2018-09-27 RX ORDER — DOCUSATE SODIUM 100 MG/1
100 CAPSULE, LIQUID FILLED ORAL 2 TIMES DAILY
Status: DISCONTINUED | OUTPATIENT
Start: 2018-09-27 | End: 2018-10-10

## 2018-09-27 NOTE — PLAN OF CARE
SKIN/TISSUE INTEGRITY - ADULT    • Skin integrity remains intact Not Progressing    • Incision(s), wounds(s) or drain site(s) healing without S/S of infection Not Progressing          SKIN/TISSUE INTEGRITY - ADULT    • Skin integrity remains intact Not Pro

## 2018-09-27 NOTE — PROGRESS NOTES
[facility] Hospitalist Progress Note                                                                   Mora BROWN 2.  1/25/1940    SUBJECTIVE:  Mr. Concepcion Ho is awake, alert, daughter at bedside. Recent Labs   Lab  09/20/18   1248   PGLU  115*       Meds:   Scheduled:   • magnesium sulfate  2 g Intravenous Once   • hydrocortisone Na succinate PF  25 mg Intravenous Q8H [facility]   • Sertraline HCl  25 mg Oral Daily   • Midodrine HCl  10 mg Oral TID NAD  Heent: EOMI  Cv: RRR, no murmur  Lungs: crackles in bases bl  Abdo: soft, nontender  Ext: No le edema  Skin: no rashes        Hypotension  - unclear etiology  - PCT negative, antibiotic course completed  - adrenal insufficiency less likely as no signi

## 2018-09-27 NOTE — PROGRESS NOTES
[facility]  Endocrinology Progress Note    Leonor Ortega Patient Status:  Inpatient    1940 MRN [id number]   [facility] 4SW-A Attending Jes Lema, [address] Day # 7 PCP Km Brown MD     Subjective:  Non-verbal. Seema Vega TECHNIQUE:  After obtaining the patient's consent, CT images were created without and with non-ionic intravenous contrast material. 3D MIP post processing was performed on the CTA Chest and Abdomen. Dose reduction techniques were used.  Dose information stable 0.6 cm cyst in the right lobe of the liver. BILIARY:  Status post cholecystectomy. No biliary tree dilation. PANCREAS:  No lesion, fluid collection, ductal dilatation, or atrophy. SPLEEN:  No enlargement or focal lesion.     KIDNEYS:  No mass, through the bifurcation. 2 of numerous plaques are noted especially in the transverse and proximal descending thoracic aorta and the common iliac arteries. The maximum transverse dimension of the abdominal aorta   is 2.5 cm, unchanged.   There is a 1 cm s

## 2018-09-27 NOTE — CONSULTS
[facility]  Report of Inpatient Wound Care Consultation    Ayaan Serum Patient Status:  Inpatient    1940 MRN [id number]   [facility] 4SW-A Attending Theora Master, [address] [facility] Day # 6 PCP Elizabeth Benítez MD     Reason for patient. Please page me at #[phone] if you have any questions about this consultation and plan of care. Time Spent 30 Minutes.     Thank you,  Toi Severance BSN, RN, [facility] [place]  Wound/Ostomy/Continence nurse    9/26/2018

## 2018-09-27 NOTE — PROGRESS NOTES
Critical Care Progress Note     Assessment / Plan:  1.  Hypotension - improved today  - picture not consistent w/ septic shock, procalcitonin negative  - CTA negative for acute aortic dissection  - not adrenally insufficient- appreciate endo recs  - continu

## 2018-09-27 NOTE — CM/SW NOTE
Requested by [facility] AP[facility] to assist with scheduling Pt Care Conference. Called to Dr. Kvng Gaytan. MD goes off service this afternoon and pt will be followed by Dr. Kira Combs tomorrow for the first time.  Per Dr. Kvng Gaytan, endocrine not currently primarily involved so does not

## 2018-09-28 RX ORDER — SODIUM PHOSPHATE, DIBASIC AND SODIUM PHOSPHATE, MONOBASIC 7; 19 G/133ML; G/133ML
1 ENEMA RECTAL ONCE AS NEEDED
Status: ACTIVE | OUTPATIENT
Start: 2018-09-28 | End: 2018-09-28

## 2018-09-28 NOTE — PHYSICAL THERAPY NOTE
PHYSICAL THERAPY RE-EVALUATION - INPATIENT     Room Number: 461/461-A  Evaluation Date: 9/28/2018  Type of Evaluation: Re-evaluation  Physician Order: PT Eval and Treat    Presenting Problem: Dehydration, hypotension  Reason for Therapy: Mobility Dys is - unable to state at this time. OBJECTIVE  Precautions:  Other (Comment)(orthostatic hypotension; right shoulder sublux; r hand splin)  Fall Risk: High fall risk    WEIGHT BEARING RESTRICTION  Weight Bearing Restriction: None                PAIN ASSES bedclothes, sheets and blankets)?: A Lot   -   Sitting down on and standing up from a chair with arms (e.g., wheelchair, bedside commode, etc.): Unable   -   Moving from lying on back to sitting on the side of the bed?: Unable   How much help from another above. Patient End of Session: In bed; With [facility] staff;Needs met;Call light within reach;RN aware of session/findings; All patient questions and concerns addressed;SCDs in place; Family present(Dori Cortez aware of declining bp at end of session)    ASSESSMENT Established Goals: 7      CURRENT GOALS     Goal #1 Pt's family will be independent with pt's HEP including PROM RLE, AAROM LLE, AAROM neck      Goal #2 Pt's family will be independent with midline positioning of patient in bed.      Goal #3 Pt's family wi

## 2018-09-28 NOTE — CM/SW NOTE
Pt Care Conference today with pt's 2 sons and 2 daughters. Rowan Davis and Lawanda, Tate Saldaña and CM attending. MD's provided clinical updates and answered family questions. Pt with ongoing symptomatic hypotension, IVF bolus currently.  Per MD's, specific

## 2018-09-28 NOTE — PROGRESS NOTES
[facility] Hospitalist Progress Note                                                                   Mora DURHAM. [birth year].  1/25/1940    SUBJECTIVE:  Mr. Wil Macdonald is awake, alert, makes eye contact, s input(s): PGLU in the last 168 hours.     Meds:   Scheduled:   • Midodrine HCl  15 mg Oral TID   • docusate sodium  100 mg Oral BID   • Vitamin B-12  1,000 mcg Oral Daily   • hydrocortisone Na succinate PF  25 mg Intravenous Q12H   • ferrous sulfate  325 mg patient going forward. Discussed plan of care with Dr. Reagan Stewart. Sherice Reddy PA-C  Pager: [phone]    . Addendum:    Patient seen and examined independently. Agree with above.   Met family during care conference today  Also saw pt at bedside    He is al

## 2018-09-28 NOTE — PLAN OF CARE
Patient/Family Goals    • Patient/Family Long Term Goal Not Progressing          CARDIOVASCULAR - ADULT    • Maintains optimal cardiac output and hemodynamic stability Progressing    • Absence of cardiac arrhythmias or at baseline Progressing        Impair

## 2018-09-28 NOTE — DIETARY NOTE
[facility]     NUTRITION INITIAL ASSESSMENT     Pt does not meet malnutrition criteria.     NUTRITION DIAGNOSIS/PROBLEM:     Inadequate energy intake related to dysphagia 2/2 L MCA stroke as evidenced by family reported weight loss past 6 months.    since the stroke, but feels appetite is finally improving. Stage II wound noted on R heel. Discussed high jeannette/pro diet and provided recommendations. Will order magic cup supplement to maximize nutrition intake.  May benefit from MVI tablet as pt does not li

## 2018-09-28 NOTE — PROGRESS NOTES
ENDOCRINOLOGY PROGRESS NOTE    Typed by Eliel Wolff MD on 9/28/2018      S:  Pt resting in bed. Non-verbal. No family at bedside presently. Family meeting today - notes reviewed and at this time, family wants to continue aggressive care,.  Per may accompany adrenal insufficiency. · He has displayed lack of BP response to high dose steroids since 9/20. This is not consistent with adrenal insufficiency.   · On hydrocortisone 25 mg IV every12 hours - tapered to this dose on 9/27/2018  · Midodrine

## 2018-09-28 NOTE — PLAN OF CARE
Assumed care of patient at 0700. Able to follow some simple commands, tracks. Able to say yes, no most of the time. R sd flaccid, attempts to use L sd to assist with turns and repositioning.  Family at bedside throughout day assisting with patient care and

## 2018-09-28 NOTE — PLAN OF CARE
Resumed care of patient at 0700. Mental status remains at baseline. Frequent repositioning. x1 episode of hypotension following PT/OT. 500mL LR bolus given with good response. Midodrine increased by MD today.  Family conference completed, patient remains fu

## 2018-09-28 NOTE — OCCUPATIONAL THERAPY NOTE
OCCUPATIONAL THERAPY EVALUATION - INPATIENT     Room Number: 461/461-A  Evaluation Date: 9/28/2018  Type of Evaluation: Re-evaluation  Presenting Problem: dehydration; hypotension    Physician Order: IP Consult to Occupational Therapy  Reason for Therapy: PAIN ASSESSMENT  Rating: Unable to rate  Location: low back; abd  Management Techniques:  Activity promotion;Repositioning    COGNITION  Arousal/Alertness:  appropriate responses to stimuli  Attention Span:  difficulty attending to directions  Orientati of personal grooming such as brushing teeth?: Total  -   Eating meals?: Total    AM-PAC Score:  Score: 6  Approx Degree of Impairment: 100%  Standardized Score (AM-PAC Scale): 17.07  CMS Modifier (G-Code): CN    FUNCTIONAL TRANSFER ASSESSMENT  Supine to Si deficits, maximizing patient’s ability to return safely to his prior level of function. OT Discharge Recommendations: Sub-acute rehabilitation(ELOS: 13- 16 days)  OT Device Recommendations: TBD    PLAN  OT Treatment Plan: Balance activities; ADL training

## 2018-09-28 NOTE — PROGRESS NOTES
Pulmonary Progress Note        NAME: Mary An - ROOM: 427/557-L - MRN: [id number] - Age: 66year old - : 1940        Last 24hrs: No events overnight, ZARINA in general is trending up though conts to have some bouts of hypotension    OBJECTIVE: HGB  10.2*  10.5*  10.2*  9.8*   MCV  91.6  93.8  92.0  94.3   PLT  285.0  267.0  261.0  231.0       Recent Labs      09/26/18   0424  09/26/18   1538  09/27/18   0259  09/27/18   0425  09/28/18   0449   NA  140   --   144  143  145*   K  3.3*  3.5*  4.2 ASSESSMENT/PLAN:  1. Hypotension - improved today  - ?related to sertraline  - continue midodrine  - lactic acid mildly elevated but stable  - cont IVF boluses PRN  - sertraline being tapered given no other clear cause for his hypotension  2.  ID

## 2018-09-29 NOTE — PROGRESS NOTES
ENDOCRINOLOGY PROGRESS NOTE    Typed by Jordyn Rojas MD on 9/29/2018      S:  Pt resting in bed. Non-verbal. Niece at bedside. Was hypotensive at 1 pm today and BP improved after that.       O:  /80   Pulse 66   Temp 98.8 °F (37.1 °C) (Tem insufficiency.   · On hydrocortisone 25 mg IV every12 hours - tapered to this dose on 9/27/2018  · Midodrine increased to 15 mg tid per primary team on 9/28/2018  · Sertraline is being decreased per primary service as they felt this might be the cause for t

## 2018-09-29 NOTE — PROGRESS NOTES
Bogdan Keita Patient Status:  Inpatient    1940 MRN [id number]   [facility] 4SW-A Attending Colt Antoine MD   [facility] Day # 9 PCP Derrell Cortez MD     Critical Care Progress Note      Assessment/Plan:  1.  Hypotension - improved Intake 1180 ml   Output —   Net 1180 ml       /82   Pulse 56   Temp 99.9 °F (37.7 °C) (Temporal)   Resp 18   Ht 5' 10\" (1.778 m)   Wt 172 lb 6.4 oz (78.2 kg)   SpO2 98%   BMI 24.74 kg/m²   Physical Exam:   General: alert, cooperative   HEENT: lips PACS, agree with radiology interpretation except where noted.

## 2018-09-30 NOTE — PROGRESS NOTES
Luis Arthur Patient Status:  Inpatient    1940 MRN [id number]   [facility] 4SW-A Attending Taurus Irwin MD   Hosp Day # 10 PCP Ayan Crisostomo MD     Critical Care Progress Note      Assessment/Plan:  1.  Hypotension - improved 2119  Last data filed at 9/29/2018 1700  Gross per 24 hour   Intake 854 ml   Output —   Net 854 ml       BP (!) 84/52   Pulse 56   Temp 98.1 °F (36.7 °C) (Temporal)   Resp 18   Ht 5' 10\" (1.778 m)   Wt 169 lb 1.5 oz (76.7 kg)   SpO2 97%   BMI 24.26 kg/m²

## 2018-09-30 NOTE — PROGRESS NOTES
[facility] Hospitalist note    PCP: Uri Feliciano MD    Chief Complaint:  F/u hypotension    SUBJECTIVE:  Apparently was quite hypotensive earlier in the day per family, got restarted on levophed. However, has been doing well the rest of the day off of it.  Ea 0. 82   CA  7.9*   --   8.1*  7.9*  7.9*  7.8*   MG   --    --    --   1.8  2.2   --    GLU  122*   --   107*  93  81  92       Recent Labs   Lab  09/24/18   1459   ALT  14*   AST  14*   ALB  1.7*       No results for input(s): PGLU in the last 168 hours. PT/OT     Pulmonary fibrosis  - appreciate pulmonary consult  - appears severe on imaging, sats had been stable on RA although notably he has now been placed on O2.  Cont to wean as possible     Constipation  - increase bowel regimen     Anemia  - no signs

## 2018-09-30 NOTE — PROGRESS NOTES
[facility] Hospitalist note    PCP: Charles Azevedo MD    Chief Complaint:  F/u hypotension    SUBJECTIVE:  Laying in bed, comfortable appearing. Pt was being cleaned up by staff and was tolerating rolling back/forth.      OBJECTIVE:  Temp:  [98.1 °F (36.7 °C)-9 PGLU in the last 168 hours. No results for input(s): TROP in the last 168 hours.       Meds:     • Midodrine HCl  15 mg Oral TID   • docusate sodium  100 mg Oral BID   • Vitamin B-12  1,000 mcg Oral Daily   • hydrocortisone Na succinate PF  25 mg Lloyd Trujillo regimen     Anemia  - no signs of acute blood loss, Hg stable at 10.2  - follow CBC     RA  - on chronic prednisone, steroids per endo as above  - MTX on hold during admission     left leg pain   - reported per family, appears comfortable today  - TEREZA dorantes

## 2018-09-30 NOTE — PROGRESS NOTES
ENDOCRINOLOGY PROGRESS NOTE    Typed by Sabrina Ureña MD on 9/30/2018      S:  Pt resting in bed. Non-verbal. Daughter at bedside and I spoke with her at length. hypotensive at at 6 am and 11 am today.        O:  BP 90/61 (BP Location: Left arm) consistent with adrenal insufficiency.   · On hydrocortisone 25 mg IV every12 hours - tapered to this dose on 9/27/2018    · Midodrine increased to 15 mg tid per primary team on 9/28/2018  · Sertraline is being decreased per primary service as they felt [facility]

## 2018-10-01 ENCOUNTER — APPOINTMENT (OUTPATIENT)
Dept: GENERAL RADIOLOGY | Facility: HOSPITAL | Age: 78
DRG: 871 | End: 2018-10-01
Attending: NURSE PRACTITIONER
Payer: MEDICARE

## 2018-10-01 PROCEDURE — 71045 X-RAY EXAM CHEST 1 VIEW: CPT | Performed by: NURSE PRACTITIONER

## 2018-10-01 RX ORDER — HYDROCORTISONE 10 MG/1
25 TABLET ORAL 2 TIMES DAILY WITH MEALS
Status: DISCONTINUED | OUTPATIENT
Start: 2018-10-01 | End: 2018-10-02

## 2018-10-01 RX ORDER — POTASSIUM CHLORIDE 20 MEQ/1
40 TABLET, EXTENDED RELEASE ORAL ONCE
Status: COMPLETED | OUTPATIENT
Start: 2018-10-01 | End: 2018-10-01

## 2018-10-01 RX ORDER — POLYVINYL ALCOHOL 14 MG/ML
1 SOLUTION/ DROPS OPHTHALMIC AS NEEDED
Status: DISCONTINUED | OUTPATIENT
Start: 2018-10-01 | End: 2018-10-10

## 2018-10-01 NOTE — PROGRESS NOTES
Mora BROWN 2. Patient Status:  Inpatient    1940 MRN [id number]   [facility] 7NE-A Attending Mary Rose MD   [facility] Day # 11 PCP Damian Llanes MD     SUBJECTIVE: Pt with copious tan secretions overnight.   [facility]) injection 4 mg, 4 mg, Intravenous, Q6H PRN  •  Metoclopramide HCl (REGLAN) injection 10 mg, 10 mg, Intravenous, Q8H PRN  •  atorvastatin (LIPITOR) tab 10 mg, 10 mg, Oral, Nightly  •  Ipratropium Bromide (ATROVENT) 0.02 % nebulizer solution 0.5 mg, 29.2   MCHC  30.3*  30.4*  30.2*   RDW  20.2*  19.9*  19.6*   NEPRELIM   --   7.50*  7.24*   WBC  9.7  9.9  8.1   PLT  225.0  191.0  154.0       No results for input(s): PT, INR, PTT in the last 168 hours.     Imaging: I have independently visualized all re

## 2018-10-01 NOTE — SLP NOTE
Order received, patient currently working with PT. Patient previously prescribed mechanical soft ground diet and has been downgraded to puree due to concern for aspiration. VFSS completed last month revealed silent aspiration with thin liquids.   Diet pre

## 2018-10-01 NOTE — PROGRESS NOTES
[facility]  Progress Note    Jay Lewis Patient Status:  Inpatient    1940 MRN [id number]   [facility] 7NE-A Attending Grace Sommers MD   [facility] Day # 6 PCP Artis Benton MD     Cc: follow up hypotension    Subjective: 10/1/2018:  CONCLUSION:  Diffuse bilateral reticular interstitial infiltrates right greater than left. There is some developing more focal consolidation in the right lung base. Heart size and pulmonary vascularity unchanged.  No pneumothorax.     Meds:   • M chronic prednisone, steroids per endo as above  - MTX on hold during admission     left leg pain   - reported per family, appears comfortable today  - CPK level WNL  - lidocaine patch     Hypernatremia, resolved at 144     # DVT Prophylaxis:   - lovenox sq

## 2018-10-01 NOTE — PHYSICAL THERAPY NOTE
PHYSICAL THERAPY TREATMENT NOTE - INPATIENT    Room Number: 8781/9066-S     Session: 1     Number of Visits to Meet Established Goals: 7    Presenting Problem: Dehydration, hypotension    History related to current admission: Pt was admitted from [facility] on 9 Static Standing: Not tested  Dynamic Standing: Not tested    ACTIVITY TOLERANCE  O2 Saturation: >92%  Room air  No shortness of breath  Heart Rate: 70's  Blood Pressure: 90 min prior to therapy noted as 121/63.   At start of therapy (pt supine in bed) = 7 active isometrics in building fashion in sets of quads, gluts, PF's; adding active min assisted SLR on left and mod assist on right after initial 3 reps of max assist.  Verbal cues throughout for visual focus, breathing, appropriate muscle activation and h in strength, dec in balance, dec in motor planning, increase in tone of core and hips, dec in ROM. The AM-PAC '6-Clicks' Inpatient Basic Mobility Short Form was completed and this patient is demonstrating a 92.36% degree of impairment in mobility.  Felton Deng

## 2018-10-01 NOTE — PLAN OF CARE
Pt transferred at . Accompanied by son. Safety precautions initiated. Bed in low position. Call light within reach. VSS. Will continue to monitor.

## 2018-10-01 NOTE — PLAN OF CARE
Assumed care at 0700. Patient alert, global aphasia. NSR per tele. Blood pressure 81/56 this afternoon,  notified, LR bolus added. On 3L NC at start on shift, weaned to RA tolerating well. Up to chair with ila lift, tolerated ok.  Patient requi

## 2018-10-01 NOTE — PLAN OF CARE
CARDIOVASCULAR - ADULT    • Maintains optimal cardiac output and hemodynamic stability Progressing    • Absence of cardiac arrhythmias or at baseline Progressing        Impaired Swallowing    • Minimize aspiration risk Progressing        Assumed care of pt

## 2018-10-01 NOTE — OCCUPATIONAL THERAPY NOTE
OCCUPATIONAL THERAPY TREATMENT NOTE - INPATIENT     Room Number: 7791/7489-G  Session: 1   Number of Visits to Meet Established Goals: 7    Presenting Problem: dehydration; hypotension    History related to current admission: Pt was admitted from [place] [address] on 9/ taking off regular upper body clothing?: Total  -   Taking care of personal grooming such as brushing teeth?: Total  -   Eating meals?: Total    AM-PAC Score:  Score: 6  Approx Degree of Impairment: 100%  Standardized Score (AM-PAC Scale): 17.07  CMS Modif days)  OT Device Recommendations: Other (Comment)(resting hand splint at night)    PLAN  OT Treatment Plan: Balance activities; ADL training;Functional transfer training;UE strengthening/ROM; Endurance training;Cognitive reorientation;Patient/Family educatio

## 2018-10-01 NOTE — PROGRESS NOTES
ENDOCRINOLOGY PROGRESS NOTE    Typed by Rosemary Herr MD on 10/1/2018      S:  Pt resting in bed. Non-verbal. Moved to stepdown floor today. Niece at bedside.  Pt has low BP right now - SBP 88 but earlier today, BP was normal and in fact, elevate consistent with adrenal insufficiency. · Continue hydrocortisone 25 mg every12 hours but will change to oral tablets today - tapered to this dose on 9/27/2018.   · Midodrine increased to 15 mg tid per primary team on 9/28/2018  · Sertraline is being decrea

## 2018-10-02 ENCOUNTER — APPOINTMENT (OUTPATIENT)
Dept: GENERAL RADIOLOGY | Facility: HOSPITAL | Age: 78
DRG: 871 | End: 2018-10-02
Attending: INTERNAL MEDICINE
Payer: MEDICARE

## 2018-10-02 PROCEDURE — 02HV33Z INSERTION OF INFUSION DEVICE INTO SUPERIOR VENA CAVA, PERCUTANEOUS APPROACH: ICD-10-PCS | Performed by: HOSPITALIST

## 2018-10-02 PROCEDURE — 71045 X-RAY EXAM CHEST 1 VIEW: CPT | Performed by: INTERNAL MEDICINE

## 2018-10-02 PROCEDURE — 99231 SBSQ HOSP IP/OBS SF/LOW 25: CPT | Performed by: CLINICAL NURSE SPECIALIST

## 2018-10-02 PROCEDURE — B548ZZA ULTRASONOGRAPHY OF SUPERIOR VENA CAVA, GUIDANCE: ICD-10-PCS | Performed by: HOSPITALIST

## 2018-10-02 RX ORDER — POTASSIUM CHLORIDE 20 MEQ/1
40 TABLET, EXTENDED RELEASE ORAL ONCE
Status: COMPLETED | OUTPATIENT
Start: 2018-10-02 | End: 2018-10-02

## 2018-10-02 RX ORDER — SODIUM CHLORIDE 0.9 % (FLUSH) 0.9 %
10 SYRINGE (ML) INJECTION AS NEEDED
Status: DISCONTINUED | OUTPATIENT
Start: 2018-10-02 | End: 2018-10-10

## 2018-10-02 RX ORDER — ARIPIPRAZOLE 15 MG/1
40 TABLET ORAL EVERY 4 HOURS
Status: DISPENSED | OUTPATIENT
Start: 2018-10-02 | End: 2018-10-02

## 2018-10-02 RX ORDER — SODIUM CHLORIDE, SODIUM LACTATE, POTASSIUM CHLORIDE, CALCIUM CHLORIDE 600; 310; 30; 20 MG/100ML; MG/100ML; MG/100ML; MG/100ML
INJECTION, SOLUTION INTRAVENOUS CONTINUOUS
Status: DISCONTINUED | OUTPATIENT
Start: 2018-10-02 | End: 2018-10-10

## 2018-10-02 RX ORDER — IBUPROFEN 400 MG/1
400 TABLET ORAL EVERY 6 HOURS PRN
Status: DISCONTINUED | OUTPATIENT
Start: 2018-10-02 | End: 2018-10-10

## 2018-10-02 RX ORDER — ACETAMINOPHEN 650 MG/1
650 SUPPOSITORY RECTAL EVERY 6 HOURS PRN
Status: DISCONTINUED | OUTPATIENT
Start: 2018-10-02 | End: 2018-10-10

## 2018-10-02 NOTE — PLAN OF CARE
Pt with fever 103.5   Tylenol. , ice packs, blood cx, chest xray ordered. RR elevated at 20-40   Dr notified.

## 2018-10-02 NOTE — PLAN OF CARE
Unable to assess alertness level due to global aphasia. 3L O2 NC. NSR on tele. BP WNL. RT arm brace in place. Pt is resting comfortably in bed. Will continue to monitor. Around 0700 pt's HR started increasing to 120s. Pt sounded congested.  Had

## 2018-10-02 NOTE — PLAN OF CARE
Received pt from floor. Pt hypotensive, LR bolus infusing. Received orders to give total 2340 mL of LR. Pt tracks in room but [facility]'t respond to [facility] NC increased to 5L and sats now above 90%. SBP remains low in the 70's.  Noted ord

## 2018-10-02 NOTE — OCCUPATIONAL THERAPY NOTE
Pt was transferred from CTU 7 to ICU this morning. Hypotension and not responding to commands. Please re-order OT evaluation when patient is medically stable and appropriate for therapy.

## 2018-10-02 NOTE — PHYSICAL THERAPY NOTE
Pt was transferred from CTU 7 to ICU this morning. Hypotension and not responding to commands. Please re-order PT evaluation when patient is medically stable and appropriate for therapy.

## 2018-10-02 NOTE — PHYSICAL THERAPY NOTE
Pt transferred to ICU due to hypotension and decreased alertness. Will need new PT orders when medically appropriate.

## 2018-10-02 NOTE — PROGRESS NOTES
ENDOCRINOLOGY PROGRESS NOTE    Typed by Eliseo Day MD on 10/2/2018      S:  Today's events noted. Pt developed hypotension, fever, worsening infiltrate on CXR - suspect aspiration and sepsis.  Transferred back to ICU     Pt's daughter is at th that may accompany adrenal insufficiency. · He has displayed lack of BP response to high dose steroids since 9/20. This is not consistent with adrenal insufficiency.   · Pt back on IV hydrocortisone 50 mg every 8 hours (stress dose)  · Midodrine 15 mg tid

## 2018-10-02 NOTE — PROGRESS NOTES
Mora BROWN 2. Patient Status:  Inpatient    1940 MRN [id number]   [facility] 7NE-A Attending Jeremy Dolan MD   [address] Day # 15 PCP Elizabeth Benítez MD     Critical Care Progress Note     Date of Admission:  Subcutaneous, Daily  •  acetaminophen (TYLENOL) tab 650 mg, 650 mg, Oral, Q6H PRN  •  ondansetron HCl (ZOFRAN) injection 4 mg, 4 mg, Intravenous, Q6H PRN  •  Metoclopramide HCl (REGLAN) injection 10 mg, 10 mg, Intravenous, Q8H PRN  •  atorvastatin (LIPITOR 6. 1    PCT: 3.28     Imaging: CXR: personally reviewed. Bilateral infiltrates with worsening findings on R     ASSESSMENT/PLAN:  1. Septic shock secondary to worsening PNA- febrile, hypotensive, elevated PCT and lactate.  Has worsening secretions and worsen

## 2018-10-02 NOTE — PROGRESS NOTES
TEREZA Hospitalist Progress Note     [facility]      SUBJECTIVE:  Patient more short of breath this morning  Got nebulizer treatment  Febrile to 103.5    OBJECTIVE:  Temp:  [97.8 °F (36.6 °C)-103.5 °F (39.7 °C)] 102.9 °F (39.4 °C)  Pulse:  [67-99] 99 yesterday with worsening CXR and elevated procalcitonin, initiated on zosyn with concern for aspiration PNA. Patient this morning more short of breath per RN and given neb treatment with some improvement. Febrile to 103.5.  Blood cultures and lactate drawn

## 2018-10-02 NOTE — PLAN OF CARE
Blood pressure not responding to bolus. Drs up to see. Lactic of 6.1 Dr aware. Pt transferred to ICU.  Report given to RN

## 2018-10-02 NOTE — PROGRESS NOTES
[facility]  Progress Note    Yolanda Eddy Patient Status:  Inpatient    1940 MRN [id number]   [facility] 7NE-A Attending Eliana Bolivar MD   [address] Day # 12 PCP Alycia Wharton MD     Cc: follow up hypotension    Subjective: 168 hours.       Imaging:  No new     Meds:   • potassium chloride  40 mEq Oral Q4H   • hydrocortisone  25 mg Oral BID with meals   • piperacillin-tazobactam  3.375 g Intravenous Q8H   • Midodrine HCl  15 mg Oral TID   • docusate sodium  100 mg Oral BID   • exam  - CPK level WNL  - leave lidoderm patch off      Hypernatremia, resolved at 144     # DVT Prophylaxis:   - lovenox sq daily    Dispo: Full code. Transfer to ICU.    Palliative consulted during admission, appreciate input      Discussed plan of care wi

## 2018-10-02 NOTE — PLAN OF CARE
Noted pt having respiratory distress and elevated HR 120s/ Pt appeared to be in distress , shaking, increased RR with congestion  BP & HR elevated. Respiratory in to give treatment. Pt stabilized.  Will monitor

## 2018-10-02 NOTE — CONSULTS
120 [facility] dosing service    Initial Pharmacokinetic Consult for Vancomycin Dosing     Sangeeta Mancini is a 66year old male admitted on 9/19/18 who had worsening CXR and elevated procalcitonin yesterday 10/1/18 was initiated on zosyn with concern for Value Ref Range    Blood Culture Result No Growth 5 Days N/A       Radiology:  Worsening right mid and lower lung airspace disease superimposed on severe fibrotic changes within the lungs.   This could represent infectious or inflammatory pneumonia in the r

## 2018-10-02 NOTE — SLP NOTE
SPEECH DAILY NOTE - INPATIENT    ASSESSMENT & PLAN   ASSESSMENT  Pt was seen for dysphagia tx to assess with diet tolerance, safety, r/o aspiration risk. [facility] with nectar thick liquids.   Unfortunately, pt was in the pro mod assistance 95 % of the time across 2 sessions.   In Progress                              FOLLOW UP  Follow Up Needed: Yes  SLP Follow-up Date: 10/02/18       Session: 1    If you have any questions, please contact Cindy William MS/MARICHUY

## 2018-10-03 ENCOUNTER — APPOINTMENT (OUTPATIENT)
Dept: GENERAL RADIOLOGY | Facility: HOSPITAL | Age: 78
DRG: 871 | End: 2018-10-03
Attending: NURSE PRACTITIONER
Payer: MEDICARE

## 2018-10-03 ENCOUNTER — APPOINTMENT (OUTPATIENT)
Dept: GENERAL RADIOLOGY | Facility: HOSPITAL | Age: 78
DRG: 871 | End: 2018-10-03
Attending: INTERNAL MEDICINE
Payer: MEDICARE

## 2018-10-03 ENCOUNTER — APPOINTMENT (OUTPATIENT)
Dept: CT IMAGING | Facility: HOSPITAL | Age: 78
DRG: 871 | End: 2018-10-03
Attending: NURSE PRACTITIONER
Payer: MEDICARE

## 2018-10-03 PROCEDURE — 71045 X-RAY EXAM CHEST 1 VIEW: CPT | Performed by: INTERNAL MEDICINE

## 2018-10-03 PROCEDURE — 71045 X-RAY EXAM CHEST 1 VIEW: CPT | Performed by: NURSE PRACTITIONER

## 2018-10-03 PROCEDURE — 70450 CT HEAD/BRAIN W/O DYE: CPT | Performed by: NURSE PRACTITIONER

## 2018-10-03 RX ORDER — MIDODRINE HYDROCHLORIDE 10 MG/1
10 TABLET ORAL 3 TIMES DAILY
Status: DISCONTINUED | OUTPATIENT
Start: 2018-10-03 | End: 2018-10-04

## 2018-10-03 RX ORDER — LORAZEPAM 2 MG/ML
0.5 INJECTION INTRAMUSCULAR ONCE
Status: COMPLETED | OUTPATIENT
Start: 2018-10-03 | End: 2018-10-03

## 2018-10-03 RX ORDER — LORAZEPAM 2 MG/ML
INJECTION INTRAMUSCULAR
Status: DISPENSED
Start: 2018-10-03 | End: 2018-10-04

## 2018-10-03 RX ORDER — LORAZEPAM 2 MG/ML
1 INJECTION INTRAMUSCULAR ONCE AS NEEDED
Status: COMPLETED | OUTPATIENT
Start: 2018-10-03 | End: 2018-10-03

## 2018-10-03 RX ORDER — LORAZEPAM 2 MG/ML
INJECTION INTRAMUSCULAR
Status: COMPLETED
Start: 2018-10-03 | End: 2018-10-03

## 2018-10-03 NOTE — PROGRESS NOTES
Candelario Turner Patient Status:  Inpatient    1940 MRN [id number]   [facility] 4SW-A Attending Eusebia Fuchs MD   [facility] Day # 15 PCP Amilcar Devlin MD     Critical Care Progress Note      Assessment/Plan:  1.  Sepsis secondary to w HCl  10 mg Oral TID   • vancomycin  15 mg/kg Intravenous Q24H   • hydrocortisone Na succinate PF  50 mg Intravenous Q8H [address]   • piperacillin-tazobactam  3.375 g Intravenous Q8H   • docusate sodium  100 mg Oral BID   • Vitamin B-12  1,000 mcg Oral Daily   • 5.4*     Recent Labs   Lab  10/02/18   1023   ABGPHT  7.44   AHGQSZ9A  41   HMZFL7A  71*   ABGHCO3  26.8*   ABGBE  3.1   TEMP  102.8   AGNIESZKA  Positive   SITE  Left Radial   DEV  Nasal cannula   THGB  9.5*     No results for input(s): BNP in the last 168 ho

## 2018-10-03 NOTE — PLAN OF CARE
GENITOURINARY - ADULT    • Absence of urinary retention Completed          CARDIOVASCULAR - ADULT    • Maintains optimal cardiac output and hemodynamic stability Progressing    • Absence of cardiac arrhythmias or at baseline Progressing          RESPIRATOR

## 2018-10-03 NOTE — PROCEDURES
ELECTROENCEPHALOGRAM REPORT      Patient Name: Zeferino Green  Chart ID: [id number]  Ordering Physician: Yamileth Harrison M.D. Date of Test: October 3, 2018  Patient Diagnosis: Seizure    A portable bedside EEG was obtained.   The patient was given lo

## 2018-10-03 NOTE — PHYSICAL THERAPY NOTE
Physical Therapy    Orders received to resume PT, but pt having eeg done for potential seizure. Will re-attempt eval as schedule allows.

## 2018-10-03 NOTE — SLP NOTE
SPEECH DAILY NOTE - INPATIENT    ASSESSMENT & PLAN   ASSESSMENT  Patient seen for skilled dysphagia intervention to reassess oropharyngeal swallowing due to decline in medical status; RN approved session.  Patient awake in bed, responsive to name and simple thick via cup x1; pureed via teaspoon x10; soft solid x2; hard solid x1.     Patient's bolus formation, mastication, and propulsion WFL; reduced bolus manipulation, with significant anterior loss noted throughout trials secondary to residual right-sided wea consistency and nectar thick liquids without overt signs or symptoms of aspiration with 100 % accuracy over 2 session(s).   Discharge   Goal #2 The patient/family/caregiver will demonstrate understanding and implementation of aspiration precautions and swal

## 2018-10-03 NOTE — PROGRESS NOTES
Called to patient's room for seizure-like activity. Patient sitting up in bed with a left gaze preference. He does not cross midline, track, or focus. Pupils are dilated 5-6 with sluggish reaction.  Did not notice any focal twitching, however RN reports

## 2018-10-03 NOTE — PROGRESS NOTES
ENDOCRINOLOGY PROGRESS NOTE    Typed by Terry Shirley MD on 10/3/2018      S: Pt non-verbal. Just returned from CT scan - brain CT done due to seizure today. Report shows a large left MCA infarct. BP stable. No hypotension since yesterday.  I glucose were all normal. No evidence of hyponatremia, hyperkalemia or hypoglycemia that may accompany adrenal insufficiency. · He has displayed lack of BP response to high dose steroids since 9/20. This is not consistent with adrenal insufficiency.   · Pt

## 2018-10-03 NOTE — CONSULTS
Wanda 2  Neurology  Hospital Consultation Report    Zeferino Green Patient Status:  Inpatient    1940 MRN [id number]   [facility] 4SW-A Attending Marisol Han MD   Hosp Day # 15 PCP Nasima Hassan MD   Date of Admiss 10 mg Nebulization Continuous   LORazepam (ATIVAN) 2 MG/ML injection      ibuprofen (MOTRIN) tab 400 mg 400 mg Oral Q6H PRN   Vancomycin HCl (VANCOCIN) 1,250 mg in sodium chloride 0.9 % 500 mL IVPB 15 mg/kg Intravenous Q24H   hydrocortisone Na succinate PF Alum & Mag Hydroxide-Simeth (MAALOX) oral suspension 30 mL 30 mL Oral QID PRN   PEG 3350 (MIRALAX) powder packet 17 g 17 g Oral Daily PRN       Medications Prior to Admission:  acetaminophen 325 MG Oral Tab Take 2 tablets (650 mg total) by mouth every 6 looking to the left, he also can weakly open the mouth a bit  Cranial nerves: conjugate gaze, limited eye movements, no ptosis  Motor:  The right hemiplegia, left hemiplegia  Sensation: Vibrato no neglect  Coordination:  Ther unable to assess  Reflexes:  Tre Taylor on 10/03/2018 at 11:13     Approved by: Jay Barr MD            Xr Chest Ap Portable  (cpt=71045)    Result Date: 10/2/2018  CONCLUSION:  Worsening right mid and lower lung airspace disease superimposed on severe fibrotic changes within the lungs.   Sulema Barthel honeycombing. There is slight decrease in the compressive atelectasis versus infiltrate in the posterior lung bases. 2.  Development of bilateral pleural effusions small on the left, small-moderate on the right.  3.  Extensive vasculopathy of the aorta thr participate in the care of your patient.     Ryan Stover M.D.  10/3/2018

## 2018-10-03 NOTE — DIETARY NOTE
[facility]     NUTRITION FOLLOW UP ASSESSMENT     Pt does not meet malnutrition criteria.     NUTRITION DIAGNOSIS/PROBLEM:     Inadequate energy intake related to dysphagia 2/2 L MCA stroke as evidenced by family reported weight loss past 6 months.    since the stroke, but feels appetite is finally improving. Stage II wound noted on R heel. Discussed high jeannette/pro diet and provided recommendations. Will order magic cup supplement to maximize nutrition intake.  May benefit from MVI tablet as pt does not li

## 2018-10-03 NOTE — PROGRESS NOTES
[facility]  Progress Note    Zoltan Talavera Patient Status:  Inpatient    1940 MRN [id number]   [facility] 7NE-A Attending Jose Posadas MD   [facility] Day # 15 PCP Hodan Lindsay MD     Cc: follow up hypotension    Subjective: 0.96   CA  7.9*  7.9*  7.8*  7.7*  8.1*  8.0*   --   7.3*   MG  1.8  2.2   --    --    --    --    --    --    GLU  93  81  92  79  111*  96   --   114*       Recent Labs   Lab  10/03/18   0407   ALT  27   AST  32   ALB  1.6*       Recent Labs   Lab  10/02 ground / nectar thick liquids recommended  - ASA, statin when po  - PT/OT - on hold while acutely ill      Pulmonary fibrosis  - suspected 2/2 RA, pulmonary following as above     Constipation  - continue bowel regimen     Anemia  - no signs of acute blood

## 2018-10-03 NOTE — CONSULTS
Consulted for second opinion as per family request     Chart reviewed from this and previous admission  Labs .  Images reviewed   Patient examined  - on 5 L o2 , follows commands drowsy , nods/ follws with eyes  appropriately , RR 20 mildly labored  , on le

## 2018-10-03 NOTE — CM/SW NOTE
Received call earlier from RN that family requesting to speak with CM about facility placement options. Subsequently, RN advises pt has had seizure and currently getting EEG done.  Family in room with patient and not appropriate to interrupt testing to t

## 2018-10-03 NOTE — PLAN OF CARE
NEUROLOGICAL - ADULT    • Achieves stable or improved neurological status Progressing        RESPIRATORY - ADULT    • Achieves optimal ventilation and oxygenation Progressing        Neurostatus is still the same, no deterioration of mental status, still on

## 2018-10-04 ENCOUNTER — APPOINTMENT (OUTPATIENT)
Dept: GENERAL RADIOLOGY | Facility: HOSPITAL | Age: 78
DRG: 871 | End: 2018-10-04
Attending: HOSPITALIST
Payer: MEDICARE

## 2018-10-04 PROCEDURE — 74230 X-RAY XM SWLNG FUNCJ C+: CPT | Performed by: HOSPITALIST

## 2018-10-04 PROCEDURE — 99233 SBSQ HOSP IP/OBS HIGH 50: CPT | Performed by: CLINICAL NURSE SPECIALIST

## 2018-10-04 RX ORDER — ARIPIPRAZOLE 15 MG/1
40 TABLET ORAL EVERY 4 HOURS
Status: COMPLETED | OUTPATIENT
Start: 2018-10-04 | End: 2018-10-04

## 2018-10-04 RX ORDER — MIDODRINE HYDROCHLORIDE 5 MG/1
5 TABLET ORAL 3 TIMES DAILY
Status: DISCONTINUED | OUTPATIENT
Start: 2018-10-04 | End: 2018-10-08

## 2018-10-04 NOTE — PROGRESS NOTES
Passadumkeagashly Baezancer Patient Status:  Inpatient    1940 MRN [id number]   [facility] 4SW-A Attending Xochitl Gant MD   [facility] Day # 15 PCP Hodan Lindsay MD     Critical Care Progress Note      Assessment/Plan:  1.  Sepsis secondary to w 3.375 g Intravenous Q8H   • docusate sodium  100 mg Oral BID   • Vitamin B-12  1,000 mcg Oral Daily   • ferrous sulfate  325 mg Oral BID with meals    And   • folic acid  1 mg Oral Daily    And   • ascorbic acid (VITAMIN C)  250 mg Oral Daily   • Sertralin [id number]  41   MTNOD9W  71*   ABGHCO3  26.8*   ABGBE  3.1   TEMP  102.8   AGNIESZKA  Positive   SITE  Left Radial   DEV  Nasal cannula   THGB  9.5*     No results for input(s): BNP in the last 168 hours.   No results for input(s): TROP, CK in the last 168 lola

## 2018-10-04 NOTE — PLAN OF CARE
Pt at baseline mental status. No episodes of seizures this evening. Seizure precautions maintained. Vital signs stable, remains off all pressors. Tube feeds started this evening, increasing towards goal rate as able, pt is tolerating thus far.  Adequate uri

## 2018-10-04 NOTE — PROGRESS NOTES
[facility]  Progress Note    Robbie Miranda Patient Status:  Inpatient    1940 MRN [id number]   [facility] 7NE-A Attending Kaye Llamas MD   [facility] Day # 15 PCP Ashley Barger MD     Cc: follow up hypotension    Subjective: 0. 96  0.85   CA  7.9*   < >  7.7*  8.1*  8.0*   --   7.3*  7.6*   MG  2.2   --    --    --    --    --    --   2.0   PHOS   --    --    --    --    --    --    --   2.3*   GLU  81   < >  79  111*  96   --   114*  146*    < > = values in this interval not d significant response with IV steroids and adrenal insufficiency less likely  - echo LVEF 65%, trop negative  - CTA neg for PE 9/19, CTA 9/24 negative for aortic dissection  - Midodrine - dose decreased in setting of hypertension this am, now 5 TID  - sertr

## 2018-10-04 NOTE — PHYSICAL THERAPY NOTE
PHYSICAL THERAPY EVALUATION - INPATIENT     Room Number: 468/468-A  Evaluation Date: 10/4/2018  Type of Evaluation: Re-evaluation  Physician Order: PT Eval and Treat    Presenting Problem: dehydration, hypotension, seizure  Reason for Therapy: Mobili inconsistently. Patient self-stated goal is unstated. OBJECTIVE  Precautions: Seizure; Other (Comment)(right shoulder subluxation)  Fall Risk: High fall risk    WEIGHT BEARING RESTRICTION  Weight Bearing Restriction: None                PAIN ASSESSM Impairment Score: 86.62%   Standardized Score (AM-PAC Scale): 28.58   CMS Modifier (G-Code): CM    FUNCTIONAL ABILITY STATUS  Gait Assessment   Gait Assistance: Not tested  Distance (ft): n/a  Assistive Device: Other (Comment)(N/A)     Stoop/Curb Assistanc Re-evaluation completed on 10/4/18 secondary to transfers to ICU secondary to hypotension, and decreased responsiveness with possible seizure  Pertinent comorbidities and personal factors impacting therapy include Recent left MCA CVA 5/2018, dysphagia, pul

## 2018-10-04 NOTE — OCCUPATIONAL THERAPY NOTE
OCCUPATIONAL THERAPY EVALUATION - INPATIENT     Room Number: 468/468-A  Evaluation Date: 10/4/2018  Type of Evaluation: Initial  Presenting Problem: Dehydration, hypotension; seizures    Physician Order: IP Consult to Occupational Therapy  Reason for SO R [facility] goal is n/a     OBJECTIVE  Precautions: Seizure; Other (Comment)(right shoulder subluxation; resting hand splint @ noc))  Fall Risk: High fall risk    WEIGHT BEARING RESTRICTION  Weight Bearing Restriction: None                PAIN ASSESSMENT  Rating: Unabl currently need…  -   Putting on and taking off regular lower body clothing?: Total  -   Bathing (including washing, rinsing, drying)?: Total  -   Toileting, which includes using toilet, bedpan or urinal? : Total  -   Putting on and taking off regular upper the patient’s ability to participate in self-care, functional mobility, instrumental activities of daily living, rest and sleep, work, leisure and social participation.      The patient is functioning below his previous functional level and would benefit f

## 2018-10-04 NOTE — DIETARY NOTE
Enteral Nutrition    Currently out of Jevity 1.2,  recommend Jevity 1.5 jeannette at 15 ml/hr advancing by 10 mls every 8 hours to goal 60 ml/hr x 21.5 (given hold time for Prilosec). Recommend 150 mls water flushes every 4 hours.     Goal TF would provide 1935

## 2018-10-04 NOTE — PLAN OF CARE
NEUROLOGICAL - ADULT    • Achieves stable or improved neurological status Progressing    • Remains free of injury related to seizure activity Progressing          RESPIRATORY - ADULT    • Achieves optimal ventilation and oxygenation Progressing          Im

## 2018-10-04 NOTE — PROGRESS NOTES
ENDOCRINOLOGY PROGRESS NOTE    Typed by Alix Tenorio MD on 10/4/2018      S: Pt non-verbal. However, looks more alert today. He pulled out his feeding tube and now he is eating - daughter is feeding him his lunch.      BP has been elevated today adrenal insufficiency. · He has displayed lack of BP response to high dose steroids since 9/20. This is not consistent with adrenal insufficiency.   · Decrease IV hydrocortisone to 25 mg every 8 hours   · Midodrine decreased to 5 mg tid per primary team o

## 2018-10-04 NOTE — VIDEO SWALLOW STUDY NOTE
ADULT VIDEOFLUOROSCOPIC SWALLOWING STUDY    Admission Date: 9/19/2018  Evaluation Date: 10/04/18  Radiologist: Dr. Hannah Summers: Mechanical soft ground  Diet Recommendations - Liquid: Nectar thick VIA TEASPOON family here, whom are very involved in his medical care. Patient with right MCA CVA in May 2018 while here in the [facility], admitted as inpatient at [facility] for several months and then was transferred to [facility] for [facility] 12.  Son reported patient with signific • Pulmonary fibrosis ([facility] [address]    • [facility] 05/2018     Current Diet Consistency: NPO  Prior Level of Function: Dependent  Prior Living Situation: Assisted living  History of Recent: Pneumonia  Precautions: Aspiration; Seizure  Imaging results: As above. Intact  Retention (VFSS - Nectar Thick Liquids):  Intact  Triggered at: Valleculae  Premature Spillage to: Valleculae  Residue Severity, Location: (None)  Laryngeal Penetration: Transient(with nectar thick via cup x1/1 (1:1 delivered))  Tracheal Aspiration: only, Feed patient with minimal assistance 100 % of the time across 2 sessions. In Progress     PLAN: Initiate skilled dysphagia intervention to educate patient and family on safe diet and swallowing strategies.      EDUCATION/INSTRUCTION  Reviewed resul

## 2018-10-04 NOTE — CONSULTS
INFECTIOUS DISEASE CONSULTATION    Brenda Wharton Patient Status:  Inpatient    1940 MRN [id number]   [facility] 4SW-A Attending Karen Angelo MD   [facility] Day # 14 PCP Bentley Espinosa PF (SOLU-cortef) injection 50 mg, 50 mg, Intravenous, Q8H ANABELLA  •  lactated ringers infusion, , Intravenous, Continuous  •  norepinephrine (LEVOPHED) 4 mg/250 ml premix infusion, 0.5-30 mcg/min, Intravenous, Continuous PRN  •  vasopressin (PITRESSIN) 20 Uni PRN    No current facility-administered medications on file prior to encounter. Current Outpatient Medications on File Prior to Encounter:  acetaminophen 325 MG Oral Tab Take 2 tablets (650 mg total) by mouth every 6 (six) hours as needed for Pain.  Disp: nontender, nondistended. Positive bowel sounds. Musculoskeletal: Full range of motion of all extremities. No swelling noted. Joints: no effusions  Skin: No lesions.  No erythema, no open wounds      Laboratory Data:      Recent Labs   Lab  10/03/18   04 Specimen: Blood,peripheral     Blood Culture Result Pseudomonas aeruginosa Abnormal     Blood Culture Smear Gram Negative Rods Abnormal     Comment: Smear and PCR Identification performed by [facility]         Pseudomonas aeruginosa by PCR     Blood Culture

## 2018-10-05 RX ORDER — MAGNESIUM OXIDE 400 MG (241.3 MG MAGNESIUM) TABLET
400 TABLET ONCE
Status: COMPLETED | OUTPATIENT
Start: 2018-10-05 | End: 2018-10-05

## 2018-10-05 RX ORDER — ENOXAPARIN SODIUM 100 MG/ML
40 INJECTION SUBCUTANEOUS DAILY
Status: DISCONTINUED | OUTPATIENT
Start: 2018-10-05 | End: 2018-10-10

## 2018-10-05 NOTE — PROGRESS NOTES
[facility]  Endocrinology Progress Note    Arik Lewis Patient Status:  Inpatient    1940 MRN [id number]   [facility] 4SW-A Attending Carmen Barragan MD   Hosp Day # 13 PCP Jazzy Tsang MD     CC: Patient presents with:  H 1149  10/03/18   1735  10/03/18   2357  10/04/18   0546  10/04/18   1230  10/04/18   1823  10/04/18   2337  10/05/18   0538  10/05/18   0753   PGLU  164*  161*  173*  179*  154*  148*  153*  125*  119*  116*       A/P  66year old man with chronic long ter

## 2018-10-05 NOTE — OCCUPATIONAL THERAPY NOTE
OCCUPATIONAL THERAPY TREATMENT NOTE - INPATIENT     Room Number: 468/468-A  Session: 1   Number of Visits to Meet Established Goals: 7    Presenting Problem: Dehydration, hypotension; seizures    History related to current admission: Pt was admitted from S does the patient currently need…  -   Putting on and taking off regular lower body clothing?: Total  -   Bathing (including washing, rinsing, drying)?: Total  -   Toileting, which includes using toilet, bedpan or urinal? : A Lot  -   Putting on and taking session patient making progress towards OT goals with improvements in balance, sitting endurance and independence in ADL tasks, however patient remains below his baseline in these and other functional areas.  Patient would benefit from continued OT services

## 2018-10-05 NOTE — PLAN OF CARE
Report received from [facility]. Patient transported with belongings and family. Received on room air. Moves left arm and foot, unable to move right. Tolerating diet of nectar thick liquids and ground diet. Naranjo catheter in place. Offloading boot in place.

## 2018-10-05 NOTE — PROGRESS NOTES
Neurology follow up NOTE    SUBJECTIVE:  He is aphasia, his granddaughter is at bed side, stated he is responded to her sometimes. He has no more seizures.      OBJECTIVE:   /76 (BP Location: Right arm)   Pulse 66   Temp 98.5 °F (36.9 °C) (Oral)   Res

## 2018-10-05 NOTE — PLAN OF CARE
Care assumed on patient at [address] [place] [address]. Patient is at neurological baseline. Hemodynamics stable. Patient tolerating mechanical soft diet with nectar thickened liquids. Patient turned q2hrs. CHG bath completed.   Will continue to monitor closely        Impaire

## 2018-10-05 NOTE — PROGRESS NOTES
[facility]  Progress Note    Michelle aH Patient Status:  Inpatient    1940 MRN [id number]   [facility] 7NE-A Attending Gal Villaseñor MD   [facility] Day # 13 PCP Alma Delia Cosme MD     Cc: follow up hypotension    Subjective: --   2.3*   --   1.7*   GLU  111*  96   --   114*  146*   --   116*       Recent Labs   Lab  10/03/18   0407   ALT  27   AST  32   ALB  1.6*       Recent Labs   Lab  10/04/18   1230  10/04/18   1823  10/04/18   2337  10/05/18   0538  10/05/18   0753   P MCA CVA 5/18  - with resultant R sided weakness, aphasia, dysphagia  - SLP consult appreciated, video swallow 10/4 - mechanical ground with nectar thick via teaspoon only recommended  - ASA, statin   - PT/OT     Pulmonary fibrosis  - suspected 2/2 RA, pulm

## 2018-10-05 NOTE — PROGRESS NOTES
[facility]                INFECTIOUS DISEASE PROGRESS NOTE    Geneva Bush Patient Status:  Inpatient    1940 MRN [id number]   [facility] 4SW-A Attending Marita Martinez MD   Hosp Day # 13 PCP Emil Bermudez MD     Antib found for: [facility]  Microbiology    Blood Culture Once [[id number]] (Abnormal) Collected: 10/02/18 0859   Order Status: Completed Lab Status: Preliminary result Updated: 10/04/18 0811   Specimen: Blood,peripheral     Blood Culture Result Pseudomonas aeruginos

## 2018-10-05 NOTE — PHYSICAL THERAPY NOTE
PHYSICAL THERAPY TREATMENT NOTE - INPATIENT    Room Number: 468/468-A     Session: 1   Number of Visits to Meet Established Goals: 5    Presenting Problem: dehydration, hypotension, seizure    History related to current admission: Pt was admitted from [facility] -  Dynamic Sitting: Poor           Static Standing: Poor -  Dynamic Standing: Dependent    ACTIVITY TOLERANCE  O2 Saturation with activity 94%  Room air  No shortness of breath    AM-PAC '6-Clicks' INPATIENT SHORT FORM - BASIC MOBILITY  How much difficulty min asst x 2; RUE supported. Bed to chair transfer completed with stand pivot technique and mod asst x 2. Discussed PT POC and discharge recommendations; pt verbalizes understanding. All questions and concerns addressed.        Patient End of Session: Up in

## 2018-10-05 NOTE — PROGRESS NOTES
Pulmonary Progress Note        NAME: Arik Lewis - ROOM: [id number] - MRN: [id number] - Age: 66year old - : 1940        Last 24hrs: No events overnight, sitting up in chair, eating lunch    OBJECTIVE:   10/05/18  0900 10/05/18  1000 10/05/18 cyanosis or edema    Recent Labs      10/03/18   0407  10/04/18   0459  10/05/18   0515   WBC  10.6  8.4  8.6   HGB  7.7*  7.5*  8.1*   MCV  96.6  95.3  95.3   PLT  88.0*  98.0*  104.0*       Recent Labs      10/02/18   1622  10/03/18   0407  10/04/18   04 Date/Time Value Ref Range Status   07/30/2012 03:40 PM 3.5 3.5 - 4.8 g/dL Final           ASSESSMENT/PLAN:  1. Sepsis secondary to worsening PNA.   - s/p IVF resuscitation  - BP improved  - taper stress steroids per endo  2.  Acute on chronic hypoxic resp

## 2018-10-05 NOTE — PLAN OF CARE
CARDIOVASCULAR - ADULT    • Maintains optimal cardiac output and hemodynamic stability Progressing    • Absence of cardiac arrhythmias or at baseline Progressing        Delirium    • Minimize duration of delirium Progressing        Impaired Activities of D

## 2018-10-05 NOTE — DIETARY NOTE
Calorie Count    10/4 Dinner - 333 kcals, 16.5g pro  10/5 Breakfast -224 kcals, 11g pro  105 Lunch - 233; 13g    Total Intake Day 1: 790kcals (45% estimated needs) , 40.5g pro (48% estimated needs)    RD to follow daily for Calorie Count      NUTRITION PRE

## 2018-10-06 RX ORDER — POTASSIUM CHLORIDE 29.8 MG/ML
40 INJECTION INTRAVENOUS ONCE
Status: COMPLETED | OUTPATIENT
Start: 2018-10-06 | End: 2018-10-06

## 2018-10-06 RX ORDER — POTASSIUM CHLORIDE 14.9 MG/ML
40 INJECTION INTRAVENOUS ONCE
Status: DISCONTINUED | OUTPATIENT
Start: 2018-10-06 | End: 2018-10-06 | Stop reason: SDUPTHER

## 2018-10-06 RX ORDER — HYDROCORTISONE 10 MG/1
20 TABLET ORAL 2 TIMES DAILY
Status: COMPLETED | OUTPATIENT
Start: 2018-10-07 | End: 2018-10-07

## 2018-10-06 RX ORDER — FUROSEMIDE 20 MG/1
20 TABLET ORAL ONCE
Status: COMPLETED | OUTPATIENT
Start: 2018-10-06 | End: 2018-10-06

## 2018-10-06 NOTE — PROGRESS NOTES
[facility]                INFECTIOUS DISEASE PROGRESS NOTE    Lacey Avendano Patient Status:  Inpatient    1940 MRN [id number]   [facility] 4SW-A Attending Ana Rosa Bryson MD   [facility] Day # 12 PCP Tomy Kaplan MD     Antib BILT  0.5   --    --    --    --    TP  5.4*   --    --    --    --        No results found for: [facility]  Microbiology    Sputum culture Once [[id number]] (Abnormal)  Collected: 09/20/18 6966   Order Status: Completed Lab Status: Final result Updated: 09/23/1 Pseudomonas aeruginosa by PCR Abnormal     Comment:  This Blood PCR Panel test's for following bacteria: Staphylococcus aureus(MRSA), Staphylococcus aureus(MSSA), Streptococcus agalactiae, S.pneumoniae, S.pyogenes, Enterococcus (VSE), Enterococcus (VRE), Anemia, unspecified type     Severe hypotension     Palliative care encounter     Goals of care, counseling/discussion      ASSESSMENT/PLAN:  1.  Aspiration pneumonia/Pseudomonas bacteremia  Sputum staph aureus ox sensitive  -repeat blood cx clearing  Co

## 2018-10-06 NOTE — PROGRESS NOTES
Pulmonary Progress Note        NAME: [address] [place] [address] Street: [id number] - MRN: [id number] - Age: 66year old - : 1940        Last 24hrs: No events overnight, more lethargic today per daughter    OBJECTIVE:   10/05/18  2100 10/06/18  0044 10/06/1 normal; no masses,  no organomegaly  Extremities: edema trace to 1+ mckenzie    Recent Labs      10/03/18   0407  10/04/18   0459  10/05/18   0515   WBC  10.6  8.4  8.6   HGB  7.7*  7.5*  8.1*   MCV  96.6  95.3  95.3   PLT  88.0*  98.0*  104.0*       Recent Lab 10/03/2018 04:07 AM 1.6 (L) 3.1 - 4.5 g/dL Final     ALBUMIN   Date/Time Value Ref Range Status   07/30/2012 03:40 PM 3.5 3.5 - 4.8 g/dL Final           ASSESSMENT/PLAN:  1. Sepsis secondary to PNA.   - BP improved  - taper stress steroids per endo  2.  A

## 2018-10-06 NOTE — DIETARY NOTE
Calorie Count - RD to follow daily     10/4 Dinner - 333 kcals, 16.5g pro  10/5 Breakfast -224 kcals, 11g pro  105 Lunch - 233; 13g  Total Intake Day 1: 790kcals (45% estimated needs) , 40.5g pro (48% estimated needs)      10/6   Breakfast - 300 kcals, 10

## 2018-10-06 NOTE — PROGRESS NOTES
[facility]  Progress Note    Camilla Leong Patient Status:  Inpatient    1940 MRN [id number]   [facility] 7NE-A Attending Mary Rose MD   [facility] Day # 12 PCP Damian Llanes MD     Cc: follow up hypotension    Subjective: < > = values in this interval not displayed.        Recent Labs   Lab  10/03/18   0407   ALT  27   AST  32   ALB  1.6*       Recent Labs   Lab  10/05/18   0538  10/05/18   0753  10/05/18   1144  10/05/18   1646  10/05/18   2051   PGLU  119*  116*  125*  112 patient maintaining a pressure     L MCA CVA 5/18  - with resultant R sided weakness, aphasia, dysphagia  - SLP consult appreciated, video swallow 10/4 - mechanical ground with nectar thick via teaspoon only recommended  - ASA, statin   - PT/OT     Pulmona

## 2018-10-06 NOTE — PLAN OF CARE
Assumed care at [address]. Pt alert, w/ global aphasia. VSS, NSR per tele. RA. Meds given per MAR. Naranjo catheter intact draining clear yellow urine. Turned and repositioned q2h per staff. On IV abt. Call light in reach. Needs attended to.   Will contin

## 2018-10-06 NOTE — PROGRESS NOTES
[facility]  Endocrinology Progress Note    Zoltan Talavera Patient Status:  Inpatient    1940 MRN [id number]   [facility] 4SW-A Attending Xochitl Gant MD   Hosp Day # 12 PCP Hodan Lindsay MD     CC: Patient presents with:  H 10/06/2018    PGLU 124 10/06/2018       Recent Labs      10/02/18   2106  10/03/18   1149  10/03/18   1735  10/03/18   2357  10/04/18   0546  10/04/18   1230  10/04/18   1823  10/04/18   2337  10/05/18   0538  10/05/18   0753   PGLU  164*  161*  173*  179*

## 2018-10-06 NOTE — PLAN OF CARE
Patient alert and aphasic. Unable to assess orientation level. Patient unable to follow all commands during assessment. Right side with muscle contractures with minimal movement. Both sides respond to pain. K, MG and Phos replaced per primary.      Ree Aver

## 2018-10-07 RX ORDER — POTASSIUM CHLORIDE 14.9 MG/ML
20 INJECTION INTRAVENOUS ONCE
Status: COMPLETED | OUTPATIENT
Start: 2018-10-07 | End: 2018-10-07

## 2018-10-07 RX ORDER — POTASSIUM CHLORIDE 20 MEQ/1
40 TABLET, EXTENDED RELEASE ORAL EVERY 4 HOURS
Status: COMPLETED | OUTPATIENT
Start: 2018-10-07 | End: 2018-10-07

## 2018-10-07 RX ORDER — PREDNISONE 1 MG/1
5 TABLET ORAL
Status: COMPLETED | OUTPATIENT
Start: 2018-10-08 | End: 2018-10-08

## 2018-10-07 RX ORDER — LEVETIRACETAM 500 MG/1
500 TABLET ORAL 2 TIMES DAILY
Status: DISCONTINUED | OUTPATIENT
Start: 2018-10-07 | End: 2018-10-10

## 2018-10-07 RX ORDER — PREDNISONE 2.5 MG
2.5 TABLET ORAL
Status: DISCONTINUED | OUTPATIENT
Start: 2018-10-09 | End: 2018-10-10

## 2018-10-07 NOTE — PROGRESS NOTES
[facility]                INFECTIOUS DISEASE PROGRESS NOTE    Brenda Wharton Patient Status:  Inpatient    1940 MRN [id number]   [facility] 4SW-A Attending Karen Angelo MD   [facility] Day # 16 PCP Primo Andres MD     Antib CO2  30.0   < >  29.0  33.0*   --   31.0   --    ALKPHO  98   --    --    --    --    --    --    AST  32   --    --    --    --    --    --    ALT  27   --    --    --    --    --    --    BILT  0.5   --    --    --    --    --    --    TP  5.4*   --    - Order Status: Completed Lab Status: Preliminary result Updated: 10/05/18 3954   Specimen: Blood,peripheral     Blood Culture Result Pseudomonas aeruginosa Abnormal     Blood Culture Smear Gram Negative Rods Abnormal     Comment: Smear and PCR Identificatio Specimen: Blood,peripheral    Blood Culture Once [[id number]] Collected: 10/07/18 6048   Order Status: Sent Lab Status:  In process Updated: 10/07/18 0927   Specimen: Blood from Bld,Picc Line          Problem list reviewed:  Patient Active Problem List:

## 2018-10-07 NOTE — PLAN OF CARE
Patient alert and oriented. NSR on tele. Room air. ID notified of blood culture results. AB adjusted. Contact iso started.        CARDIOVASCULAR - ADULT    • Maintains optimal cardiac output and hemodynamic stability Progressing    • Absence of cardiac arrh

## 2018-10-07 NOTE — PLAN OF CARE
Assumed care at 1 . Pt alert, follows some commands. Aphasic. Family at bedside. VSS on RA. NSR/SB per tele. Naranjo intact, clear yellow output. Scotal and BLE edema. Potassium replaced per lytes protocol. L arm picc intact.    Resting in bed

## 2018-10-07 NOTE — PROGRESS NOTES
[facility] Note: Route Optimization for Levetiracetam (KEPPRA)    Patient is currently on Levetiracetam (KEPPRA) 500 mg IV every 12 hours.    The patient meets the criteria to convert to the oral equivalent as established by the IV to Oral conversion prot

## 2018-10-07 NOTE — PROGRESS NOTES
[facility]  Progress Note    Aliya Garcia Patient Status:  Inpatient    1940 MRN [id number]   [facility] 7NE-A Attending Galina Andersen MD   [facility] Day # 16 PCP Mahad Simon MD     Cc: follow up hypotension    Subjective: --    MG   --   2.0   --   1.8  1.5*  2.1  1.9   --    PHOS   --   2.3*   --   1.7*  1.9*  2.5  2.1*   --    GLU  114*  146*   --   116*  84   --   86   --     < > = values in this interval not displayed.        Recent Labs   Lab  10/03/18   0407   ALT  27 negative    Hypokalemia  Hypomagnesemia  Hypophosphatemia  - Replete lytes, monitoring    Chronic hypotension - improved  Suspected orthostatic hypotension  - etiology of previous hypotension unknown; adrenal insufficiency less likely as no response to IV

## 2018-10-07 NOTE — PROGRESS NOTES
Pulmonary Progress Note        NAME: [address] [place] [address] Street: [id number] - MRN: [id number] - Age: 66year old - : 1940        Last 24hrs: No events overnight, legs still swollen    OBJECTIVE:   10/06/18  1920 10/07/18  0047 10/07/18  0446 10/07/1 organomegaly  Extremities: edema 1+ mckenzie       Recent Labs      10/03/18   0407  10/04/18   0459  10/05/18   0515   WBC  10.6  8.4  8.6   HGB  7.7*  7.5*  8.1*   MCV  96.6  95.3  95.3   PLT  88.0*  98.0*  104.0*       Recent Labs      10/02/18   1622  10/03 4.5 g/dL Final     ALBUMIN   Date/Time Value Ref Range Status   07/30/2012 03:40 PM 3.5 3.5 - 4.8 g/dL Final           ASSESSMENT/PLAN:  1. Sepsis secondary to PNA.   - BP improved  - taper stress steroids per endo  2.  Pnuemonia and pseudomonas bacteremia

## 2018-10-08 RX ORDER — MEROPENEM 1 G/1
1 INJECTION, POWDER, FOR SOLUTION INTRAVENOUS EVERY 12 HOURS
Qty: 22 G | Refills: 0 | Status: SHIPPED | OUTPATIENT
Start: 2018-10-09 | End: 2018-01-01

## 2018-10-08 RX ORDER — POTASSIUM CHLORIDE 20 MEQ/1
40 TABLET, EXTENDED RELEASE ORAL EVERY 4 HOURS
Status: COMPLETED | OUTPATIENT
Start: 2018-10-08 | End: 2018-10-08

## 2018-10-08 NOTE — DIETARY NOTE
Calorie Count - RD to follow daily     10/4 Dinner - 333 kcals, 16.5g pro  10/5  Breakfast -224 kcals, 11g pro  Lunch - 233; 13g  Total Intake Day 1: 790kcals (45% estimated needs) , 40.5g pro (48% estimated needs)    10/6   Breakfast - 300 kcals, 10 g pro

## 2018-10-08 NOTE — PLAN OF CARE
Assumed care at [address]. Patient alert, follows some commands. VSS on RA. NSR per tele. (+) BM. Naranjo intact. Contact isolation precautions maintained. Dressing to R heel changed. Will continue to monitor.     CARDIOVASCULAR - ADULT    • Maintains

## 2018-10-08 NOTE — PROGRESS NOTES
[facility]                INFECTIOUS DISEASE PROGRESS NOTE    Fantasma Beyer Patient Status:  Inpatient    1940 MRN [id number]   [facility] 4SW-A Attending Yoni Proctor MD   [facility] Day # 25 PCP Manohar Jauregui MD     Antib ALT  27   --    --    --    --    --    --    --    BILT  0.5   --    --    --    --    --    --    --    TP  5.4*   --    --    --    --    --    --    --     < > = values in this interval not displayed.        No results found for: [facility]  Microbiology Blood Culture Result Pseudomonas aeruginosa Abnormal     Blood Culture Smear Gram Negative Rods Abnormal     Comment: Smear and PCR Identification performed by Otilia Israel         Pseudomonas aeruginosa by PCR Abnormal        Susceptibility      Pseudomonas ae Order Status: Sent Lab Status:  In process Updated: 10/07/18 0927   Specimen: Blood from Bld,Picc Line          Problem list reviewed:  Patient Active Problem List:     Primary osteoarthritis of left knee     Cerebrovascular accident ([facility] [address]     Mabel Blankenship

## 2018-10-08 NOTE — CONSULTS
[address] Patient Status:  Inpatient    1940 MRN [id number]   [facility] 7NE-A Attending Bob Franco, [address] [facility] Day # 18 PCP Primo Anders MD     Patient Identification  Brenda Wharton is a 66 yea ascites. CT Brain 10/3   CONCLUSION:    1. Large area of encephalomalacia in the left hemisphere consistent with large left MCA distribution infarct. Infarcts in the left thalamus, left midbrain and viki also noted.   Infarcts in the right basal gangli powder packet 1 packet 1 packet Oral BID   [COMPLETED] magnesium sulfate IVPB premix 4 g 4 g Intravenous Once   [COMPLETED] Potassium Phosphate Dibasic 30 mmol in sodium chloride 0.9% 250 mL IVPB 30 mmol Intravenous Once   [COMPLETED] potassium chloride IV Chloride ER (K-DUR M20) CR tab 40 mEq 40 mEq Oral Once   Normal Saline Flush 0.9 % injection 10 mL 10 mL Intravenous PRN   acetaminophen (TYLENOL) 650 MG rectal suppository 650 mg 650 mg Rectal Q6H PRN   [COMPLETED] Potassium Chloride ER (K-DUR M20) CR tab sodium chloride 0.9% IV bolus 500 mL 500 mL Intravenous Once   [COMPLETED] lactated ringers IV bolus 1,000 mL 1,000 mL Intravenous Once   [COMPLETED] iohexol (OMNIPAQUE) 350 MG/ML injection 100 mL 100 mL Intravenous ONCE PRN   [COMPLETED] lactated ringers Tobacco Use      Smoking status: Former Smoker      Smokeless tobacco: Former User    Alcohol use: No      Frequency: Never      No family history on file.     Allergies:    Lactose                 OTHER (SEE COMMENTS)    Comment:Lactose intolerant        L is 0.  ROM WNL. Left Upper Extremity:  Strength is 4-5. ROM WNL. Right Lower Extremity:  Strength  is 0.   ROM WNL. Left Lower Extremity: Strength  is 4-5. ROM WNL. Neuro: CNII-XII are grossly intact.  Sensation to dull touch impaired in r

## 2018-10-08 NOTE — CM/SW NOTE
PT/OT now recommending acute rehab. Spoke with family who now wants to find out if pt will qualify for . Referral sent to  for eval.  If pt does not qualify for acute rehab, they may want to choose another SNF and not have pt return to [facility].   [facility]

## 2018-10-08 NOTE — PROGRESS NOTES
[facility]  Progress Note    Melissanickolas Freeman Patient Status:  Inpatient    1940 MRN [id number]   [facility] 7NE-A Attending Lázaro Yanes MD   [facility] Day # 25 PCP Libby Moreira MD     Cc: follow up hypotension    Subjective: --   7.2*   MG  2.0   --   1.8  1.5*  2.1  1.9   --    --   1.9   PHOS  2.3*   --   1.7*  1.9*  2.5  2.1*   --    --   1.9*   GLU  146*   --   116*  84   --   86   --    --   73    < > = values in this interval not displayed.        Recent Labs   Lab  10/0 orthostatic hypotension  - etiology of previous hypotension unknown  - adrenal insufficiency less likely as no response to IV steroids, no hyponatermia  - echo LVEF 65%, trop negative  - CTA neg for PE 9/19, CTA 9/24 negative for aortic dissection  - sertr

## 2018-10-08 NOTE — PROGRESS NOTES
[facility]  Endocrinology Progress Note    Bogdan Keita Patient Status:  Inpatient    1940 MRN [id number]   [facility] 4SW-A Attending Kd Lyles MD   Hosp Day # 25 PCP Derrell Cortez MD     CC: Patient presents with:  H 10/05/18   0538  10/05/18   0753   PGLU  164*  161*  173*  179*  154*  148*  153*  125*  119*  116*       A/P  66year old man with chronic long term steroid use for RA admitted with sepsis and hypotension  1.  Hypotension  - on chronic prednisone 2.5 mg d

## 2018-10-08 NOTE — SLP NOTE
SPEECH DAILY NOTE - INPATIENT    ASSESSMENT & PLAN   ASSESSMENT  Pt seen for dysphagia tx to assess tolerance with recommended diet, ensure appropriate utilization of aspiration precautions and provide pt/family education.  Pt found sitting upright in bed; Slow rate, Small bites, Small sips, Alternate liquids/solids, No straws, Upright 90 degrees, Upright 90 degrees 30 mins after meal, Liquids via tsp amount only, Feed patient with minimal assistance 100 % of the time across 2 sessions.                 FOLLOW

## 2018-10-08 NOTE — OCCUPATIONAL THERAPY NOTE
OCCUPATIONAL THERAPY TREATMENT NOTE - INPATIENT     Room Number: 7695/0948-M  Session: 2   Number of Visits to Meet Established Goals: 7    Presenting Problem: Dehydration, hypotension; seizures    History related to current admission:  Pt was admitted fro person does the patient currently need…  -   Putting on and taking off regular lower body clothing?: Total  -   Bathing (including washing, rinsing, drying)?: Total  -   Toileting, which includes using toilet, bedpan or urinal? : Total  -   Putting on and met;Call light within reach;RN aware of session/findings; All patient questions and concerns addressed;SCDs in place; Family present    ASSESSMENT   Pt is a 65 y/o male with history of CVA in May 2018.   Pt returned from [facility] in September of 2018 and was t

## 2018-10-09 RX ORDER — LEVETIRACETAM 500 MG/1
500 TABLET ORAL 2 TIMES DAILY
Qty: 30 TABLET | Refills: 0 | Status: SHIPPED | OUTPATIENT
Start: 2018-10-09 | End: 2018-01-01

## 2018-10-09 RX ORDER — SERTRALINE HYDROCHLORIDE 25 MG/1
25 TABLET, FILM COATED ORAL DAILY
Qty: 30 TABLET | Refills: 0 | Status: SHIPPED | OUTPATIENT
Start: 2018-10-09 | End: 2018-01-01

## 2018-10-09 NOTE — OCCUPATIONAL THERAPY NOTE
OCCUPATIONAL THERAPY TREATMENT NOTE - INPATIENT     Room Number: 7461/3862-P  Session: 3   Number of Visits to Meet Established Goals: 7    Presenting Problem: Dehydration, hypotension; seizures    History related to current admission:  Pt was admitted fro clothing?: Total  -   Bathing (including washing, rinsing, drying)?: Total  -   Toileting, which includes using toilet, bedpan or urinal? : Total  -   Putting on and taking off regular upper body clothing?: Total  -   Taking care of personal grooming such weakness with expressive aphasia and was receiving assistance for all ADLs, IADLs and functional transfers since stroke in May of 2018. Pt discharge undetermined and therapy potential is guarded at this time.   Consider subacute rehab vs 24 hour care with H

## 2018-10-09 NOTE — SLP NOTE
SPEECH DAILY NOTE - INPATIENT    ASSESSMENT & PLAN   ASSESSMENT  Pt seen for dysphagia tx to assess tolerance with recommended diet, ensure appropriate utilization of aspiration precautions, and provide pt/family education.   Pt found reclined in bed; alert rehabilitation    Treatment Plan  Treatment Plan/Recommendations: Dysphagia therapy    Interdisciplinary Communication: Discussed with staff            GOALS  Goal 1 The patient will tolerate mechanical soft ground consistency and nectar thick (via teaspoo

## 2018-10-09 NOTE — PLAN OF CARE
Assumed care at [address]. AOx1. Hx of CVA. Son at bedside. Seizure/Fall/Aspiration precautions maintained. Remains on IV fluid/ABT. Naranjo cathter in place. Updates given to Son. Continue on calorie count.     CARDIOVASCULAR - ADULT    • Maintains optimal

## 2018-10-09 NOTE — CM/SW NOTE
Call placed to pts daughter in law per RN. Kami Mayo asked me to call pts son - VM left for son. Apparently the pt will be clear to dc today. PMR has not recommended AR - list of SARs provided previously.  Will need to get choice of DUARTE from family and off

## 2018-10-09 NOTE — PROGRESS NOTES
[facility]                INFECTIOUS DISEASE PROGRESS NOTE    Arik Lewis Patient Status:  Inpatient    1940 MRN [id number]   [facility] 4SW-A Attending Carmen Barragan MD   [facility] Day # 23 PCP Jazzy Tsang MD     Antib TP  5.4*   --    --    --    --    --    --     < > = values in this interval not displayed.        No results found for: [facility]  Microbiology    Sputum culture Once [[id number]] (Abnormal)  Collected: 09/20/18 3324   Order Status: Completed Lab Status: Final Comment: Smear and PCR Identification performed by Kristina         Pseudomonas aeruginosa by PCR Abnormal        Susceptibility      Pseudomonas aeruginosa     Not Specified    Cefepime <=2  Sensitive    Ciprofloxacin <=1  Sensitive    Gentamicin 4  Sensit Patient Active Problem List:     Primary osteoarthritis of left knee     Cerebrovascular accident [facility])     Cerebrovascular accident (CVA) due to other mechanism     Pulmonary fibrosis ([facility] [address]     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

## 2018-10-09 NOTE — DIETARY NOTE
Calorie Count Results    10/4 Dinner - 333 kcals, 16.5g pro  10/5  Breakfast -224 kcals, 11g pro  Lunch - 233; 13g  Total Intake Day 1: 790kcals (45% estimated needs) , 40.5g pro (48% estimated needs)    10/6   Breakfast - 300 kcals, 10 g protein  Lunch- 3

## 2018-10-09 NOTE — DIETARY NOTE
[facility]     NUTRITION FOLLOW UP ASSESSMENT     Pt does not meet malnutrition criteria.   Late entry  NUTRITION DIAGNOSIS/PROBLEM:     Inadequate energy intake related to dysphagia 2/2 L MCA stroke as evidenced by family reported weight loss past 6 hydration status can be closely monitored. 9/24- Pt resting comfortably in bed. Grandson at bedside, endorses good appetite/PO today at breakfast and lunch. Pt enjoys magic cup. Tolerating diet/supplements well.      9/19- Pt is a 67 y/o m w/ pmhx of L 144     Pt is at moderate nutrition risk     FOLLOW-UP DATE:  10/12/2018     Jorge Preston RD, LDN  Pager [phone]

## 2018-10-09 NOTE — OCCUPATIONAL THERAPY NOTE
OCCUPATIONAL THERAPY TREATMENT NOTE - INPATIENT     Room Number: 7397/5962-H  Session: 3   Number of Visits to Meet Established Goals: 7    Presenting Problem: Dehydration, hypotension; seizures    History related to current admission:  Pt was admitted fro clothing?: Total  -   Bathing (including washing, rinsing, drying)?: Total  -   Toileting, which includes using toilet, bedpan or urinal? : Total  -   Putting on and taking off regular upper body clothing?: Total  -   Taking care of personal grooming such weakness with expressive aphasia and was receiving assistance for all ADLs, IADLs and functional transfers since stroke in May of 2018. Pt discharge undetermined and therapy potential is guarded at this time.   Consider subacute rehab vs 24 hour care with H

## 2018-10-09 NOTE — PLAN OF CARE
Problem: Impaired Swallowing  Goal: Minimize aspiration risk  Interventions:  - Nectar thick liquids via cup (small, single sips) and mechanical ground consistencies   - Patient should be alert and upright for all feedings (90 degrees preferred)  - Offer f

## 2018-10-09 NOTE — PROGRESS NOTES
[facility]  Endocrinology Progress Note    Reji Beach Patient Status:  Inpatient    1940 MRN [id number]   [facility] 4SW-A Attending Kayden May MD   [facility] Day # 23 PCP Jeffry Ortiz MD     CC: Patient presents with:  H with chronic long term steroid use for RA admitted with sepsis and hypotension  1.  Hypotension  - on chronic prednisone 2.5 mg daily for RA at home  - Random cortisol was 9.6 but this is difficult to interpret in the setting of high dose hydrocortisone use

## 2018-10-09 NOTE — PHYSICAL THERAPY NOTE
PHYSICAL THERAPY TREATMENT NOTE - INPATIENT    Room Number: 6024/1527-Z     Session: 2    Number of Visits to Meet Established Goals: 5    Presenting Problem: dehydration, hypotension, seizure    History related to current admission: Pt was admitted from  Restriction: None                PAIN ASSESSMENT   Ratin  Location: buttocks  Management Techniques: Repositioning    BALANCE                                                                                                                    Static Sitt Guidelines updated in Pt room for nursing staff. Patient End of Session: Up in chair;Needs met;Call light within reach;RN aware of session/findings; All patient questions and concerns addressed    ASSESSMENT     Pt seen for PROM, heel cord stretching

## 2018-10-09 NOTE — CM/SW NOTE
Spoke with pts son. He was informed of PMR recc for DUARTE at this time, and the possibility of a re-eval at a later date if the pt is able to be more alert and participatory.  After a long discussion, he has chosen for referrals to go to [facility]

## 2018-10-09 NOTE — PROGRESS NOTES
[facility]  Progress Note    Bogdan Keita Patient Status:  Inpatient    1940 MRN [id number]   [facility] 7NE-A Attending Elijah Buckley MD   [facility] Day # 23 PCP Derrell Cortez MD     Cc: follow up     Subjective:      Mr. Maxim Khanna 0.52*   CA  7.8*  7.4*   --   7.2*   --    --   7.2*   --   7.7*   MG  1.8  1.5*  2.1  1.9   --    --   1.9   --    --    PHOS  1.7*  1.9*  2.5  2.1*   --    --   1.9*   --    --    GLU  116*  84   --   86   --    --   73   --   77       Recent Labs   Lab for PE 9/19, CTA 9/24 negative for aortic dissection  - sertraline weaned to 25 mg daily on 9/27- discussed with family, will continue at this dose  - Midodrine discontinued as BP improved     L MCA CVA 5/18  - with resultant R sided weakness, aphasia, dys

## 2018-10-10 VITALS
DIASTOLIC BLOOD PRESSURE: 60 MMHG | WEIGHT: 171.94 LBS | RESPIRATION RATE: 21 BRPM | HEART RATE: 68 BPM | HEIGHT: 70 IN | OXYGEN SATURATION: 94 % | BODY MASS INDEX: 24.61 KG/M2 | TEMPERATURE: 98 F | SYSTOLIC BLOOD PRESSURE: 121 MMHG

## 2018-10-10 NOTE — PLAN OF CARE
Good appetite. Calorie count stopped and dietician reported accurate intake. Naranjo care completed. Hypospadia and scrotal swelling noted. Coccyx dressing changed after BM today. Was up in chair with 2 assist transfer turn and pivot.

## 2018-10-10 NOTE — PLAN OF CARE
Assumed care at [address]. AOx1 global aphagia. No s/s of pain or discomfort. Right sided paralysis as baseline. Remains on IV ABT/fluid. Had good appetite today. Plan to go [facility] today if available.   Plan of care discussed with Son and Annie

## 2018-10-10 NOTE — CM/SW NOTE
Family has chosen [facility] for [address]. The pt has been accepted, pending bed availability - message left with [facility] to determine if a bed will be available today, as pt is ready for dc.      Julieth Cartagena RN, [facility]    [phone] pgr

## 2018-10-10 NOTE — DISCHARGE SUMMARY
General Medicine Discharge Summary     Patient ID:  Rashid Smith  66year old  1/25/1940    Admit date: 9/19/2018    Discharge date and time: 10/10/2018    Attending Physician: Giovani Andino MD HIPA / VILMA negative     Hypotension, resolved  - etiology of previous sustained hypotension unknown  - adrenal insufficiency less likely as no response to IV steroids, no hyponatermia  - echo LVEF 65%, trop negative  - CTA neg for PE 9/19, CTA 9/24 negativ meropenem 1 g Solr  Commonly known as:  MERREM  Inject 1 g into the vein every 12 (twelve) hours for 11 days.         CHANGE how you take these medications    Sertraline HCl 25 MG Tabs  Commonly known as:  ZOLOFT  Take 1 tablet (25 mg total) by mouth salbador 99.2 °F (37.3 °C)       General: awake, right facial droop noted, global aphasia  Heart: RRR, no murmur  Lungs: decreased / coarse at bilateral bases  Abdomen: soft, no grimace with palpation  Extremities: no pedal edema, contractures of RUE  Neuro: right

## 2018-10-10 NOTE — CM/SW NOTE
Family choice is for the pt to return to [facility] P:[phone] N\"[phone].   [facility] sent to confirm they will accept the pt to return and to confirm a late dc after Mukesh Fang, RN, [facility]    [phone] pgr: [phone]

## 2018-10-10 NOTE — PROGRESS NOTES
[facility]                INFECTIOUS DISEASE PROGRESS NOTE    Zeferino Green Patient Status:  Inpatient    1940 MRN [id number]   [facility] ([place], [place]) 4SW-A Attending Marisol Han MD   Hosp Day # 21 PCP Nasima Hassan MD     Antib Comment: Detected inducible resistance to Clindamycin. If used monitor therapy for resistance.        Sputum Smear 4+ WBCs seen     3+ Gram positive cocci in pairs, chains and clusters     3+ Gram Positive Rods     3+ Gram Negative Rods     3+ Yeast   Susc Order Status: Completed Lab Status: Preliminary result Updated: 10/07/18 0725   Specimen: Blood,peripheral     Blood Culture Result Klebsiella pneumoniae ESBL Pos Abnormal     Blood Culture Smear Gram Negative Rods Abnormal     Comment: Previous positive. Palliative care encounter     Goals of care, counseling/discussion      ASSESSMENT/PLAN:  1.  Aspiration pneumonia  Blood cx - 1 set Pseudomonas, 2nd set Klebsiella ESBL  Sputum: staph aureus ox sensitive  -repeat blood cx no growth to date  Stable on Ro

## 2018-10-10 NOTE — SLP NOTE
SPEECH DAILY NOTE - INPATIENT    ASSESSMENT & PLAN   ASSESSMENT  Pt seen for dysphagia tx to assess tolerance with recommended diet, ensure appropriate utilization of aspiration precautions and provide pt/family education.  Pt found semi-reclined in bed vandana tolerate mechanical soft ground consistency and nectar thick (via cup with small single sips) liquids without overt signs or symptoms of aspiration with 100 % accuracy over 2 session(s).  In Progress             FOLLOW UP  Follow Up Needed: Yes  SLP Follow

## 2018-10-11 NOTE — CM/SW NOTE
10/11/18 0900   Discharge disposition   Expected discharge disposition Skilled Nurs   Name of Jordon Vallejo   Patient is Discharged to a [address] [place] [address] Yes   Discharge transportation [facility]

## 2019-01-01 ENCOUNTER — APPOINTMENT (OUTPATIENT)
Dept: CT IMAGING | Facility: HOSPITAL | Age: 79
DRG: 246 | End: 2019-01-01
Attending: HOSPITALIST
Payer: MEDICARE

## 2019-01-01 ENCOUNTER — APPOINTMENT (OUTPATIENT)
Dept: CT IMAGING | Facility: HOSPITAL | Age: 79
DRG: 246 | End: 2019-01-01
Attending: EMERGENCY MEDICINE
Payer: MEDICARE

## 2019-01-01 ENCOUNTER — APPOINTMENT (OUTPATIENT)
Dept: ULTRASOUND IMAGING | Facility: HOSPITAL | Age: 79
DRG: 246 | End: 2019-01-01
Attending: INTERNAL MEDICINE
Payer: MEDICARE

## 2019-01-01 ENCOUNTER — APPOINTMENT (OUTPATIENT)
Dept: ULTRASOUND IMAGING | Facility: HOSPITAL | Age: 79
DRG: 246 | End: 2019-01-01
Attending: HOSPITALIST
Payer: MEDICARE

## 2019-01-01 ENCOUNTER — APPOINTMENT (OUTPATIENT)
Dept: GENERAL RADIOLOGY | Facility: HOSPITAL | Age: 79
DRG: 246 | End: 2019-01-01
Attending: INTERNAL MEDICINE
Payer: MEDICARE

## 2019-01-01 ENCOUNTER — APPOINTMENT (OUTPATIENT)
Dept: CT IMAGING | Facility: HOSPITAL | Age: 79
DRG: 246 | End: 2019-01-01
Attending: NURSE PRACTITIONER
Payer: MEDICARE

## 2019-01-01 ENCOUNTER — HOSPITAL ENCOUNTER (INPATIENT)
Facility: HOSPITAL | Age: 79
LOS: 2 days | DRG: 283 | End: 2019-01-01
Attending: INTERNAL MEDICINE | Admitting: INTERNAL MEDICINE
Payer: OTHER MISCELLANEOUS

## 2019-01-01 ENCOUNTER — APPOINTMENT (OUTPATIENT)
Dept: INTERVENTIONAL RADIOLOGY/VASCULAR | Facility: HOSPITAL | Age: 79
DRG: 246 | End: 2019-01-01
Attending: INTERNAL MEDICINE
Payer: MEDICARE

## 2019-01-01 ENCOUNTER — APPOINTMENT (OUTPATIENT)
Dept: GENERAL RADIOLOGY | Facility: HOSPITAL | Age: 79
DRG: 246 | End: 2019-01-01
Attending: EMERGENCY MEDICINE
Payer: MEDICARE

## 2019-01-01 ENCOUNTER — HOSPITAL ENCOUNTER (INPATIENT)
Facility: HOSPITAL | Age: 79
LOS: 12 days | Discharge: HOSPICE | DRG: 246 | End: 2019-01-01
Attending: EMERGENCY MEDICINE | Admitting: HOSPITALIST
Payer: MEDICARE

## 2019-01-01 ENCOUNTER — APPOINTMENT (OUTPATIENT)
Dept: CV DIAGNOSTICS | Facility: HOSPITAL | Age: 79
DRG: 246 | End: 2019-01-01
Attending: INTERNAL MEDICINE
Payer: MEDICARE

## 2019-01-01 VITALS
TEMPERATURE: 97 F | HEART RATE: 70 BPM | SYSTOLIC BLOOD PRESSURE: 103 MMHG | BODY MASS INDEX: 19.93 KG/M2 | RESPIRATION RATE: 19 BRPM | DIASTOLIC BLOOD PRESSURE: 52 MMHG | OXYGEN SATURATION: 100 % | WEIGHT: 150.38 LBS | HEIGHT: 73 IN

## 2019-01-01 VITALS
HEART RATE: 86 BPM | RESPIRATION RATE: 15 BRPM | DIASTOLIC BLOOD PRESSURE: 48 MMHG | SYSTOLIC BLOOD PRESSURE: 101 MMHG | TEMPERATURE: 98 F | OXYGEN SATURATION: 90 %

## 2019-01-01 DIAGNOSIS — R06.00 DYSPNEA, UNSPECIFIED TYPE: ICD-10-CM

## 2019-01-01 DIAGNOSIS — J84.10 PULMONARY FIBROSIS (HCC): ICD-10-CM

## 2019-01-01 DIAGNOSIS — G40.909 SEIZURE DISORDER (HCC): ICD-10-CM

## 2019-01-01 DIAGNOSIS — I21.3 ACUTE ST ELEVATION MYOCARDIAL INFARCTION (STEMI), UNSPECIFIED ARTERY (HCC): Primary | ICD-10-CM

## 2019-01-01 DIAGNOSIS — R41.82 ALTERED MENTAL STATUS, UNSPECIFIED ALTERED MENTAL STATUS TYPE: ICD-10-CM

## 2019-01-01 PROCEDURE — 71045 X-RAY EXAM CHEST 1 VIEW: CPT | Performed by: INTERNAL MEDICINE

## 2019-01-01 PROCEDURE — 99233 SBSQ HOSP IP/OBS HIGH 50: CPT | Performed by: INTERNAL MEDICINE

## 2019-01-01 PROCEDURE — 92928 PRQ TCAT PLMT NTRAC ST 1 LES: CPT | Performed by: INTERNAL MEDICINE

## 2019-01-01 PROCEDURE — 0BH18EZ INSERTION OF ENDOTRACHEAL AIRWAY INTO TRACHEA, VIA NATURAL OR ARTIFICIAL OPENING ENDOSCOPIC: ICD-10-PCS | Performed by: ANESTHESIOLOGY

## 2019-01-01 PROCEDURE — 3E0G76Z INTRODUCTION OF NUTRITIONAL SUBSTANCE INTO UPPER GI, VIA NATURAL OR ARTIFICIAL OPENING: ICD-10-PCS | Performed by: INTERNAL MEDICINE

## 2019-01-01 PROCEDURE — 93926 LOWER EXTREMITY STUDY: CPT | Performed by: HOSPITALIST

## 2019-01-01 PROCEDURE — 99291 CRITICAL CARE FIRST HOUR: CPT | Performed by: OTHER

## 2019-01-01 PROCEDURE — 99231 SBSQ HOSP IP/OBS SF/LOW 25: CPT | Performed by: INTERNAL MEDICINE

## 2019-01-01 PROCEDURE — 93306 TTE W/DOPPLER COMPLETE: CPT | Performed by: INTERNAL MEDICINE

## 2019-01-01 PROCEDURE — 4A023N7 MEASUREMENT OF CARDIAC SAMPLING AND PRESSURE, LEFT HEART, PERCUTANEOUS APPROACH: ICD-10-PCS | Performed by: INTERNAL MEDICINE

## 2019-01-01 PROCEDURE — 99233 SBSQ HOSP IP/OBS HIGH 50: CPT | Performed by: NURSE PRACTITIONER

## 2019-01-01 PROCEDURE — 99223 1ST HOSP IP/OBS HIGH 75: CPT | Performed by: INTERNAL MEDICINE

## 2019-01-01 PROCEDURE — 99291 CRITICAL CARE FIRST HOUR: CPT | Performed by: INTERNAL MEDICINE

## 2019-01-01 PROCEDURE — 99232 SBSQ HOSP IP/OBS MODERATE 35: CPT | Performed by: OTHER

## 2019-01-01 PROCEDURE — 76775 US EXAM ABDO BACK WALL LIM: CPT | Performed by: INTERNAL MEDICINE

## 2019-01-01 PROCEDURE — 99232 SBSQ HOSP IP/OBS MODERATE 35: CPT | Performed by: INTERNAL MEDICINE

## 2019-01-01 PROCEDURE — B211YZZ FLUOROSCOPY OF MULTIPLE CORONARY ARTERIES USING OTHER CONTRAST: ICD-10-PCS | Performed by: INTERNAL MEDICINE

## 2019-01-01 PROCEDURE — 99221 1ST HOSP IP/OBS SF/LOW 40: CPT | Performed by: INTERNAL MEDICINE

## 2019-01-01 PROCEDURE — 027035Z DILATION OF CORONARY ARTERY, ONE ARTERY WITH TWO DRUG-ELUTING INTRALUMINAL DEVICES, PERCUTANEOUS APPROACH: ICD-10-PCS | Performed by: INTERNAL MEDICINE

## 2019-01-01 PROCEDURE — 74176 CT ABD & PELVIS W/O CONTRAST: CPT | Performed by: HOSPITALIST

## 2019-01-01 PROCEDURE — 95812 EEG 41-60 MINUTES: CPT | Performed by: OTHER

## 2019-01-01 PROCEDURE — 70450 CT HEAD/BRAIN W/O DYE: CPT | Performed by: EMERGENCY MEDICINE

## 2019-01-01 PROCEDURE — 93931 UPPER EXTREMITY STUDY: CPT | Performed by: HOSPITALIST

## 2019-01-01 PROCEDURE — 0DJ08ZZ INSPECTION OF UPPER INTESTINAL TRACT, VIA NATURAL OR ARTIFICIAL OPENING ENDOSCOPIC: ICD-10-PCS | Performed by: INTERNAL MEDICINE

## 2019-01-01 PROCEDURE — 95819 EEG AWAKE AND ASLEEP: CPT | Performed by: OTHER

## 2019-01-01 PROCEDURE — 30233N1 TRANSFUSION OF NONAUTOLOGOUS RED BLOOD CELLS INTO PERIPHERAL VEIN, PERCUTANEOUS APPROACH: ICD-10-PCS | Performed by: INTERNAL MEDICINE

## 2019-01-01 PROCEDURE — 99233 SBSQ HOSP IP/OBS HIGH 50: CPT | Performed by: OTHER

## 2019-01-01 PROCEDURE — 0D9670Z DRAINAGE OF STOMACH WITH DRAINAGE DEVICE, VIA NATURAL OR ARTIFICIAL OPENING: ICD-10-PCS | Performed by: INTERNAL MEDICINE

## 2019-01-01 PROCEDURE — 02HV33Z INSERTION OF INFUSION DEVICE INTO SUPERIOR VENA CAVA, PERCUTANEOUS APPROACH: ICD-10-PCS | Performed by: INTERNAL MEDICINE

## 2019-01-01 PROCEDURE — 93970 EXTREMITY STUDY: CPT | Performed by: INTERNAL MEDICINE

## 2019-01-01 PROCEDURE — 71045 X-RAY EXAM CHEST 1 VIEW: CPT | Performed by: EMERGENCY MEDICINE

## 2019-01-01 PROCEDURE — 0W3P8ZZ CONTROL BLEEDING IN GASTROINTESTINAL TRACT, VIA NATURAL OR ARTIFICIAL OPENING ENDOSCOPIC: ICD-10-PCS | Performed by: INTERNAL MEDICINE

## 2019-01-01 PROCEDURE — 0D9680Z DRAINAGE OF STOMACH WITH DRAINAGE DEVICE, VIA NATURAL OR ARTIFICIAL OPENING ENDOSCOPIC: ICD-10-PCS | Performed by: INTERNAL MEDICINE

## 2019-01-01 PROCEDURE — 71275 CT ANGIOGRAPHY CHEST: CPT | Performed by: NURSE PRACTITIONER

## 2019-01-01 PROCEDURE — 93454 CORONARY ARTERY ANGIO S&I: CPT | Performed by: INTERNAL MEDICINE

## 2019-01-01 PROCEDURE — 5A1955Z RESPIRATORY VENTILATION, GREATER THAN 96 CONSECUTIVE HOURS: ICD-10-PCS | Performed by: ANESTHESIOLOGY

## 2019-01-01 RX ORDER — MORPHINE SULFATE 4 MG/ML
2 INJECTION, SOLUTION INTRAMUSCULAR; INTRAVENOUS ONCE
Status: COMPLETED | OUTPATIENT
Start: 2019-01-01 | End: 2019-01-01

## 2019-01-01 RX ORDER — POLYETHYLENE GLYCOL 3350 17 G/17G
17 POWDER, FOR SOLUTION ORAL DAILY PRN
Status: DISCONTINUED | OUTPATIENT
Start: 2019-01-01 | End: 2019-01-01

## 2019-01-01 RX ORDER — POTASSIUM CHLORIDE 29.8 MG/ML
40 INJECTION INTRAVENOUS ONCE
Status: DISCONTINUED | OUTPATIENT
Start: 2019-01-01 | End: 2019-01-01

## 2019-01-01 RX ORDER — AMIODARONE HYDROCHLORIDE 200 MG/1
400 TABLET ORAL 2 TIMES DAILY WITH MEALS
Status: DISCONTINUED | OUTPATIENT
Start: 2019-01-01 | End: 2019-01-01

## 2019-01-01 RX ORDER — ASPIRIN 300 MG
300 SUPPOSITORY, RECTAL RECTAL ONCE
Status: DISCONTINUED | OUTPATIENT
Start: 2019-01-01 | End: 2019-01-01

## 2019-01-01 RX ORDER — POTASSIUM CHLORIDE 29.8 MG/ML
40 INJECTION INTRAVENOUS ONCE
Status: COMPLETED | OUTPATIENT
Start: 2019-01-01 | End: 2019-01-01

## 2019-01-01 RX ORDER — HEPARIN SODIUM AND DEXTROSE 10000; 5 [USP'U]/100ML; G/100ML
INJECTION INTRAVENOUS CONTINUOUS
Status: DISCONTINUED | OUTPATIENT
Start: 2019-01-01 | End: 2019-01-01

## 2019-01-01 RX ORDER — HEPARIN SODIUM 5000 [USP'U]/ML
INJECTION, SOLUTION INTRAVENOUS; SUBCUTANEOUS
Status: COMPLETED
Start: 2019-01-01 | End: 2019-01-01

## 2019-01-01 RX ORDER — LORAZEPAM 2 MG/ML
INJECTION INTRAMUSCULAR
Status: DISPENSED
Start: 2019-01-01 | End: 2019-01-01

## 2019-01-01 RX ORDER — FUROSEMIDE 10 MG/ML
20 INJECTION INTRAMUSCULAR; INTRAVENOUS ONCE
Status: COMPLETED | OUTPATIENT
Start: 2019-01-01 | End: 2019-01-01

## 2019-01-01 RX ORDER — SODIUM CHLORIDE 9 MG/ML
INJECTION, SOLUTION INTRAVENOUS ONCE
Status: COMPLETED | OUTPATIENT
Start: 2019-01-01 | End: 2019-01-01

## 2019-01-01 RX ORDER — POTASSIUM CHLORIDE 14.9 MG/ML
20 INJECTION INTRAVENOUS ONCE
Status: COMPLETED | OUTPATIENT
Start: 2019-01-01 | End: 2019-01-01

## 2019-01-01 RX ORDER — SODIUM CHLORIDE 0.9 % (FLUSH) 0.9 %
10 SYRINGE (ML) INJECTION AS NEEDED
Status: DISCONTINUED | OUTPATIENT
Start: 2019-01-01 | End: 2019-01-01

## 2019-01-01 RX ORDER — ACETAMINOPHEN 650 MG/1
650 SUPPOSITORY RECTAL EVERY 6 HOURS PRN
Status: DISCONTINUED | OUTPATIENT
Start: 2019-01-01 | End: 2019-01-01

## 2019-01-01 RX ORDER — DEXMEDETOMIDINE HYDROCHLORIDE 4 UG/ML
INJECTION, SOLUTION INTRAVENOUS CONTINUOUS
Status: DISCONTINUED | OUTPATIENT
Start: 2019-01-01 | End: 2019-01-01

## 2019-01-01 RX ORDER — SODIUM CHLORIDE 30 MG/ML INHALATION SOLUTION 30 MG/ML
3 SOLUTION INHALANT
Status: DISCONTINUED | OUTPATIENT
Start: 2019-01-01 | End: 2019-01-01

## 2019-01-01 RX ORDER — LORAZEPAM 2 MG/ML
2 INJECTION INTRAMUSCULAR EVERY 30 MIN PRN
Status: DISCONTINUED | OUTPATIENT
Start: 2019-01-01 | End: 2019-01-01

## 2019-01-01 RX ORDER — LEVETIRACETAM 500 MG/1
500 TABLET ORAL 2 TIMES DAILY
Status: DISCONTINUED | OUTPATIENT
Start: 2019-01-01 | End: 2019-01-01

## 2019-01-01 RX ORDER — CLOPIDOGREL BISULFATE 75 MG/1
75 TABLET ORAL DAILY
Status: DISCONTINUED | OUTPATIENT
Start: 2019-01-01 | End: 2019-01-01

## 2019-01-01 RX ORDER — ADENOSINE 3 MG/ML
INJECTION, SOLUTION INTRAVENOUS
Status: COMPLETED
Start: 2019-01-01 | End: 2019-01-01

## 2019-01-01 RX ORDER — ETOMIDATE 2 MG/ML
INJECTION INTRAVENOUS
Status: DISCONTINUED
Start: 2019-01-01 | End: 2019-01-01 | Stop reason: WASHOUT

## 2019-01-01 RX ORDER — HEPARIN SODIUM 5000 [USP'U]/ML
30 INJECTION, SOLUTION INTRAVENOUS; SUBCUTANEOUS ONCE
Status: COMPLETED | OUTPATIENT
Start: 2019-01-01 | End: 2019-01-01

## 2019-01-01 RX ORDER — SCOLOPAMINE TRANSDERMAL SYSTEM 1 MG/1
1 PATCH, EXTENDED RELEASE TRANSDERMAL
Status: DISCONTINUED | OUTPATIENT
Start: 2019-01-01 | End: 2019-01-01

## 2019-01-01 RX ORDER — LORAZEPAM 2 MG/ML
1 INJECTION INTRAMUSCULAR ONCE
Status: COMPLETED | OUTPATIENT
Start: 2019-01-01 | End: 2019-01-01

## 2019-01-01 RX ORDER — ACETAMINOPHEN 650 MG/1
650 SUPPOSITORY RECTAL EVERY 4 HOURS PRN
Status: DISCONTINUED | OUTPATIENT
Start: 2019-01-01 | End: 2019-01-01

## 2019-01-01 RX ORDER — HALOPERIDOL 5 MG/ML
2 INJECTION INTRAMUSCULAR
Status: DISCONTINUED | OUTPATIENT
Start: 2019-01-01 | End: 2019-01-01

## 2019-01-01 RX ORDER — ONDANSETRON 2 MG/ML
4 INJECTION INTRAMUSCULAR; INTRAVENOUS EVERY 6 HOURS PRN
Status: DISCONTINUED | OUTPATIENT
Start: 2019-01-01 | End: 2019-01-01

## 2019-01-01 RX ORDER — HEPARIN SODIUM AND DEXTROSE 10000; 5 [USP'U]/100ML; G/100ML
18 INJECTION INTRAVENOUS ONCE
Status: COMPLETED | OUTPATIENT
Start: 2019-01-01 | End: 2019-01-01

## 2019-01-01 RX ORDER — SODIUM CHLORIDE 9 MG/ML
INJECTION, SOLUTION INTRAVENOUS CONTINUOUS
Status: DISCONTINUED | OUTPATIENT
Start: 2019-01-01 | End: 2019-01-01

## 2019-01-01 RX ORDER — ACETAMINOPHEN 160 MG/5ML
650 SOLUTION ORAL EVERY 6 HOURS PRN
Status: DISCONTINUED | OUTPATIENT
Start: 2019-01-01 | End: 2019-01-01

## 2019-01-01 RX ORDER — CHLORHEXIDINE GLUCONATE 0.12 MG/ML
15 RINSE ORAL
Status: DISCONTINUED | OUTPATIENT
Start: 2019-01-01 | End: 2019-01-01

## 2019-01-01 RX ORDER — LIDOCAINE HYDROCHLORIDE 10 MG/ML
INJECTION, SOLUTION EPIDURAL; INFILTRATION; INTRACAUDAL; PERINEURAL
Status: COMPLETED
Start: 2019-01-01 | End: 2019-01-01

## 2019-01-01 RX ORDER — AMIODARONE HYDROCHLORIDE 200 MG/1
200 TABLET ORAL 2 TIMES DAILY WITH MEALS
Status: DISCONTINUED | OUTPATIENT
Start: 2019-01-01 | End: 2019-01-01

## 2019-01-01 RX ORDER — ATROPINE SULFATE 10 MG/ML
2 SOLUTION/ DROPS OPHTHALMIC EVERY 2 HOUR PRN
Status: DISCONTINUED | OUTPATIENT
Start: 2019-01-01 | End: 2019-01-01

## 2019-01-01 RX ORDER — LORAZEPAM 2 MG/ML
0.5 INJECTION INTRAMUSCULAR EVERY 30 MIN PRN
Status: DISCONTINUED | OUTPATIENT
Start: 2019-01-01 | End: 2019-01-01

## 2019-01-01 RX ORDER — SODIUM POLYSTYRENE SULFONATE 4.1 MEQ/G
15 POWDER, FOR SUSPENSION ORAL; RECTAL ONCE
Status: COMPLETED | OUTPATIENT
Start: 2019-01-01 | End: 2019-01-01

## 2019-01-01 RX ORDER — HEPARIN SODIUM 5000 [USP'U]/ML
80 INJECTION INTRAVENOUS; SUBCUTANEOUS ONCE
Status: COMPLETED | OUTPATIENT
Start: 2019-01-01 | End: 2019-01-01

## 2019-01-01 RX ORDER — IPRATROPIUM BROMIDE AND ALBUTEROL SULFATE 2.5; .5 MG/3ML; MG/3ML
3 SOLUTION RESPIRATORY (INHALATION)
Status: DISCONTINUED | OUTPATIENT
Start: 2019-01-01 | End: 2019-01-01

## 2019-01-01 RX ORDER — CLOPIDOGREL BISULFATE 75 MG/1
300 TABLET ORAL ONCE
Status: COMPLETED | OUTPATIENT
Start: 2019-01-01 | End: 2019-01-01

## 2019-01-01 RX ORDER — LORAZEPAM 2 MG/ML
1 INJECTION INTRAMUSCULAR EVERY 30 MIN PRN
Status: DISCONTINUED | OUTPATIENT
Start: 2019-01-01 | End: 2019-01-01

## 2019-01-01 RX ORDER — ACETAMINOPHEN 325 MG/1
650 TABLET ORAL EVERY 6 HOURS PRN
Status: DISCONTINUED | OUTPATIENT
Start: 2019-01-01 | End: 2019-01-01

## 2019-01-01 RX ORDER — ONDANSETRON 4 MG/1
4 TABLET, ORALLY DISINTEGRATING ORAL EVERY 6 HOURS PRN
Status: DISCONTINUED | OUTPATIENT
Start: 2019-01-01 | End: 2019-01-01

## 2019-01-01 RX ORDER — MORPHINE SULFATE 4 MG/ML
1 INJECTION, SOLUTION INTRAMUSCULAR; INTRAVENOUS
Status: DISCONTINUED | OUTPATIENT
Start: 2019-01-01 | End: 2019-01-01

## 2019-01-01 RX ORDER — ENALAPRILAT 2.5 MG/2ML
0.62 INJECTION INTRAVENOUS EVERY 6 HOURS
Status: DISCONTINUED | OUTPATIENT
Start: 2019-01-01 | End: 2019-01-01

## 2019-01-01 RX ORDER — BISACODYL 10 MG
10 SUPPOSITORY, RECTAL RECTAL
Status: DISCONTINUED | OUTPATIENT
Start: 2019-01-01 | End: 2019-01-01

## 2019-01-01 RX ORDER — METOPROLOL TARTRATE 5 MG/5ML
5 INJECTION INTRAVENOUS EVERY 6 HOURS
Status: DISCONTINUED | OUTPATIENT
Start: 2019-01-01 | End: 2019-01-01

## 2019-01-01 RX ORDER — SODIUM PHOSPHATE, DIBASIC AND SODIUM PHOSPHATE, MONOBASIC 7; 19 G/133ML; G/133ML
1 ENEMA RECTAL ONCE AS NEEDED
Status: DISCONTINUED | OUTPATIENT
Start: 2019-01-01 | End: 2019-01-01

## 2019-01-01 RX ORDER — METOPROLOL TARTRATE 5 MG/5ML
2.5 INJECTION INTRAVENOUS EVERY 6 HOURS
Status: DISCONTINUED | OUTPATIENT
Start: 2019-01-01 | End: 2019-01-01

## 2019-01-01 RX ORDER — ACETAMINOPHEN 160 MG/5ML
650 SOLUTION ORAL EVERY 4 HOURS PRN
Status: DISCONTINUED | OUTPATIENT
Start: 2019-01-01 | End: 2019-01-01

## 2019-01-01 RX ORDER — PHENYLEPHRINE HCL IN 0.9% NACL 50MG/250ML
PLASTIC BAG, INJECTION (ML) INTRAVENOUS CONTINUOUS
Status: DISCONTINUED | OUTPATIENT
Start: 2019-01-01 | End: 2019-01-01

## 2019-01-01 RX ORDER — DEXMEDETOMIDINE HYDROCHLORIDE 4 UG/ML
INJECTION, SOLUTION INTRAVENOUS CONTINUOUS
Status: CANCELLED | OUTPATIENT
Start: 2019-01-01

## 2019-01-01 RX ORDER — HALOPERIDOL 5 MG/ML
1 INJECTION INTRAMUSCULAR
Status: DISCONTINUED | OUTPATIENT
Start: 2019-01-01 | End: 2019-01-01

## 2019-01-01 RX ORDER — GLYCOPYRROLATE 0.2 MG/ML
0.4 INJECTION, SOLUTION INTRAMUSCULAR; INTRAVENOUS
Status: DISCONTINUED | OUTPATIENT
Start: 2019-01-01 | End: 2019-01-01

## 2019-01-01 RX ORDER — MIDAZOLAM HYDROCHLORIDE 1 MG/ML
INJECTION INTRAMUSCULAR; INTRAVENOUS
Status: COMPLETED
Start: 2019-01-01 | End: 2019-01-01

## 2019-10-03 PROBLEM — R41.82 ALTERED MENTAL STATUS, UNSPECIFIED ALTERED MENTAL STATUS TYPE: Status: ACTIVE | Noted: 2019-01-01

## 2019-10-03 PROBLEM — I21.3 ACUTE ST ELEVATION MYOCARDIAL INFARCTION (STEMI), UNSPECIFIED ARTERY (HCC): Status: ACTIVE | Noted: 2019-01-01

## 2019-10-03 PROBLEM — I21.3 ACUTE ST ELEVATION MYOCARDIAL INFARCTION (STEMI) (HCC): Status: ACTIVE | Noted: 2019-01-01

## 2019-10-03 PROBLEM — G40.909 SEIZURE DISORDER (HCC): Status: ACTIVE | Noted: 2019-01-01

## 2019-10-03 PROBLEM — R06.00 DYSPNEA, UNSPECIFIED TYPE: Status: ACTIVE | Noted: 2019-01-01

## 2019-10-03 NOTE — CONSULTS
Pulmonary / Critical Care H&P/Consult       NAME: [address] [place] [address] Street: 1022/4983-A - MRN: [id number] - Age: 78year old - :  1940    Date of Admission: 10/3/2019  6:31 AM  Admission Diagnosis: Pulmonary fibrosis (HCC) [J84.10]  Seizure disorde Former Smoker      Smokeless tobacco: Former User    Substance and Sexual Activity      Alcohol use: No        Frequency: Never      Drug use: No      Sexual activity: Not Currently    Lifestyle      Physical activity:        Days per week: Not on file Wt 171 lb 15.3 oz (78 kg)   SpO2 96%   BMI 24.67 kg/m²     General Appearance:    Awake, nonverbal, does not follow commands.     Head:    Normocephalic, without obvious abnormality, atraumatic   Eyes:    PERRL, conjunctiva/corneas clear     Neck:   Supple etiology  2. Seizure: h/o prior cva and seizures  - neuro c/s  3. Stemi: s/p stent to RCA. Unclear how this ties to above, if at all. - per cards. 4. pulm fibrosis; felt related to RA. Doubt flare.   - O2 as needed. 5. FEN: NPO for now.  Swallow eval

## 2019-10-03 NOTE — PROCEDURES
[address] NAME: Gregor Lanes   ATTENDING PHYSICIAN: Emilia Argueta M.D. OPERATING PHYSICIAN: Nathalia Aguilar M.D.    PATIENT ACCOUNT#:   [de-identified]    LOCATION:  [address] [place] [address]  MEDICAL RECORD #:   [id number]       DATE OF LETITIA

## 2019-10-03 NOTE — PROGRESS NOTES
10/03/19 0850   Clinical Encounter Type   Visited With Family  (Patient's son (Travis Moffett.  Julio Mora [phone]) and daughter-in-law Moses Tameka [phone]))   Routine Visit   (Responded to buzz (Cath Lab))   Continue Visiting   (Encouraged to call Laine Rodas

## 2019-10-03 NOTE — CONSULTS
[facility]    PATIENT'S NAME: Jany Headley   ATTENDING PHYSICIAN: Cora Szymanski M.D.   CONSULTING PHYSICIAN: Helen Garza M.D.    PATIENT ACCOUNT#:   [de-identified]    LOCATION:  [address] [place] [address]  MEDICAL RECORD #:   [id number]       DATE OF BIR sure if the EKG changes are related to his seizure. At the present time, patient going to the cath lab. Patient have angiography. The risks, benefits, and alternatives were discussed. We will check serial EKGs and enzymes.    2.   Recent seizure with pr

## 2019-10-03 NOTE — PLAN OF CARE
Pt. Unresponsive, vitals stable, family at bedside, bilateral lower extremities positive for dvts, chest CTA performed no results yet, heparin drip started, q1 hour neuro checks.

## 2019-10-03 NOTE — PROGRESS NOTES
RECEIVED PT ON BIPAP 12/6 @100%; RESPIRATIONS UNLABORED. TRANSPORTED TO CT AND TO CATH LAB- ABG WAS DONE, SETTINGS CHANGED.      10/03/19 0720   BiPAP   $ RT Standby Charge (per 15 min) 1   Device V60   BiPAP / CPAP CE# [id number]   Mode Spontaneous/Timed

## 2019-10-03 NOTE — H&P
[facility] Hospitalist History and Physical      Patient presents with:  Seizure Disorder (neurologic)       PCP: Jocy Orourke      History of Present Illness: Patient is a 78year old male with PMH sig for pulm fibrosis, CVA with R sided hemiparesis, seizure (muscle spasms). Disp: 20 tablet Rfl: 0   omeprazole 2mg/ml Oral Suspension Take 10 mL (20 mg total) by mouth daily. Disp: 90 mL Rfl: 0   Vitamin B-12 1000 MCG Oral Tab Take 1,000 mcg by mouth daily.  Disp:  Rfl:    Calcium Carbonate 1250 (500 Ca) MG Oral C TROP 0.137 10/03/2019    PGLU 147 10/03/2019       CXR: image personally reviewed. Radiology: Ct Brain Or Head ([id number])    Result Date: 10/3/2019  PROCEDURE:  CT BRAIN OR HEAD (08653)  COMPARISON:  OLEG CT BRAIN OR HEAD (34338), 10/03/2018, 13:16. OLEG , XR CHEST AP PORTABLE  (CPT=71045), 10/03/2018, 10:53. INDICATIONS:  seizure  PATIENT STATED HISTORY: (As transcribed by Technologist)  Patient offered no additional history at this time.          CONCLUSION:  1. Stable mild cardiomegaly with moder in house  A total of 70 minutes taken with patient and coordinating care. Greater than 50% face to face encounter.       Kacey Day DO  [facility] Hospitalist      **Certification      PHYSICIAN Certification of Need for Inpatient Hospitalization - Initial C

## 2019-10-03 NOTE — CONSULTS
[facility]  Neurocritical Care       Name: Brenda Wharton  MRN: [id number]  Admission Date/Time: 10/3/2019  6:31 AM  Primary Care Provider:  Grupo Hagen        Reason for Consultation:   Seizures    HPI:   Patient is a pleasant 7 Pulmonary fibrosis ([facility]         Date Noted: 09/18/2018      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heumatoid factor ([facility]         Date Noted: 09/18/2018      Cerebrovascular accident [facility])         Date Noted: 09/06/2018      Fabiola Amaya (constipation). Must be dissolved in thickened water Disp: 10 each Rfl: 0 Taking   Vitamin B-12 1000 MCG Oral Tab Take 1,000 mcg by mouth daily.  Disp:  Rfl:  Taking       Lactose                 OTHER (SEE COMMENTS)    Comment:Lactose intolerant  Social Hi Noncontrast CT scanning is performed through the brain. Dose reduction techniques were used. Dose information is transmitted to the [facility] [employer] of Radiology) NRDR ([address]) which includes the Dose Index Registry.   OLIVIA 2. Again noted is extensive pulmonary fibrosis. There is resolution of the airspace disease in the left retrocardiac region. 3. There is overall decrease in the airspace disease in the right lower lobe compared to the study of 10/3/2018.   There is also de the neurologist.  4. Seizures precautions. 5. Ct Brain - large LMCA infarction with encephalomalacia. 6. IV fluids 75ml/hr  7. GI/DVT Prophylaxis.   8. PT/OT Eval.  - Neuro checks Q 1hr    Cardiac:  - Systolic BP Goal as per Cards, he did have an acute MI

## 2019-10-03 NOTE — SLP NOTE
Order received, chart reviewed and d/w RN who requested SLP hold evaluation as Pt was given Ativan due to seizures. SLP to f/u tomorrow or as appropriate.    Shania Chavarria MS CCC-SLP/L, pager [phone]  Speech-LanguagePathologist

## 2019-10-03 NOTE — PROGRESS NOTES
79 y/o male with hx CVA, seizure disorder, pulm fibrosis presents with seizure and found to have ST elevation ant lat leads    LV not done  LAD normal   Cx normal  RCA diffuse 90%    Al 1 guide. predil with 3.0 balloon.  3.5 x 38 and 4.0 x 15 Sancho  I am not

## 2019-10-03 NOTE — PROCEDURES
ELECTROENCEPHALOGRAM REPORT      Patient Name: Robert Villegas  Chart ID: [id number]  Ordering Physician:                  Date of Test: 10/3/2019  Referring Physician: Goyo Long routine EEG was obtained. No sedation was given.     DX: [id number]  Board Certified in Neurology, Vascular Neurology(Stroke), Neurocritical Care and Headache Medicine.

## 2019-10-04 NOTE — PROGRESS NOTES
[facility]  Neurocritical Care       Subjective: Edgar Boles is a(n) 78year old male s/p old rmca infarction, seizures came in with seizures and acute mi. Review of Systems    Unable to obtain.     Vitals:   Blood pressure 103 region. Superimposed moderate chronic deep white matter ischemic changes noted in the subcortical white matter of the cerebrum. Old chronic ischemia extends into the left basal ganglia and thalami. There is atrophy of the left mid brain.  SINUSES: Occlusive thrombus noted within the left external iliac vein. The visualized IVC appears patent. OTHER:  Negative. CONCLUSION:  Extensive thrombus noted within bilateral lower extremities, left greater than right.  This critical value result was call resolution of the airspace disease in the left retrocardiac region. 3. There is overall decrease in the airspace disease in the right lower lobe compared to the study of 10/3/2018.   There is also decrease in the right pleural effusion in the long interval. Within the AP window there is a 2.7 x 2 point 2 cm lymph node which has increased since prior examination when it measured 1.7 x 1.2 cm. Large precarinal lymph node measuring 2.5 x 4.0 cm which previously measured 1.4 x 1.9 cm.   Prominent right hilar lym Alec Valencia MD on 10/03/2019 at 18:48     Approved by: Alec Valencia MD              Lab Review     Lab Results   Component Value Date    WBC 20.0 10/04/2019    HGB 9.5 10/04/2019    HCT 32.3 10/04/2019    .0 10/04/2019    CREATSERUM 0.80 10 Problem:    Acute ST elevation myocardial infarction (STEMI), unspecified artery (HCC)  Active Problems:    Pulmonary fibrosis (HCC)    Acute ST elevation myocardial infarction (STEMI) (HCC)    Seizure disorder (HCC)    Altered mental status, unspecified a MD  Medical Director - Stroke and [address] [place] [address] - In affiliation with [facility]. Board Certified in Neurology, Vascular Neurology(Stroke), Neurocritical Care and Headache Medicine.

## 2019-10-04 NOTE — OCCUPATIONAL THERAPY NOTE
Attempted to see pt on 10/4/2019. Nurse reported that pt was not medically appropriate to be seen today. Will follow up tomorrow as appropriate.

## 2019-10-04 NOTE — PLAN OF CARE
Assumed care of pt @ 1930. Rec'd pt in bed, resting. Pt. Nonverbal, moves left arm to deep pain stimuli. R. Side flaccid. Per son, pt. Is blind in right eye, moans/grunts @ home to communicate. R. Facial droop noted.   Tele=ST, rate 120's, normotensive

## 2019-10-04 NOTE — PHYSICAL THERAPY NOTE
PT eval orders received, chart reviewed. Attempted to see pt on 10/4/2019. Nurse reported that pt was not medically appropriate to be seen today. Will follow up as appropriate.

## 2019-10-04 NOTE — SLP NOTE
Attempted evaluation. Discussed with RN, patient mental status not yet appropriate for evaluation. Will hold today and continue to follow.       Kandi Durant Christiano 87 CCC-SLP  Pager [phone]

## 2019-10-04 NOTE — PROCEDURES
ELECTROENCEPHALOGRAM REPORT      Patient Name: Shell Rios  Chart ID: [id number]  Ordering Physician: Kalie BROOKS Date of Test: 10/4/2019  Referring Physician: Geraldine Shukla routine EEG was obtained.   No sedation was given.     DX: PULMONARY FIBR Neurocritical Care and Headache Medicine.

## 2019-10-04 NOTE — PROGRESS NOTES
10/04/19 1427   Clinical Encounter Type   Visited With Patient and family together   Routine Visit Follow-up   Sacramental Encounters   Sacrament of Sick-Anointing Anointed   The patient was seen by Royal Rodriguez.  Received prayer, Scripture,

## 2019-10-04 NOTE — PROGRESS NOTES
[facility] Hospitalist Progress Note                                                                   Mora BROWN 2.  1/25/1940    SUBJECTIVE:  Pt seen and examined.   Intubated for increased W 3350, magnesium hydroxide, bisacodyl, FLEET ENEMA, influenza virus vaccine PF, LORazepam    Assessment/Plan:  Principal Problem:    Acute ST elevation myocardial infarction (STEMI), unspecified artery (HCC)  Active Problems:    Pulmonary fibrosis ([facility]

## 2019-10-04 NOTE — PROGRESS NOTES
Called to bedside for possible ongoing seizures. Patient noted to be looking to the right, with locking type action of jaw with puffing of cheeks with breathing. HR and RR elevated. Episode last for less than a minute.   Following event, patient was note

## 2019-10-04 NOTE — PROGRESS NOTES
Mora BROWN 2. Patient Status:  Inpatient    1940 MRN [id number]   [facility] 6NE-A Attending Taurus Irwin MD   Hosp Day # 1 PCP Grover Howe / Critical Care Progress Note     S: eeg suggestive of on (mg/dL)   Date Value   07/30/2012 1.1     Creatinine (mg/dL)   Date Value   10/04/2019 0.80   10/03/2019 0.62 (L)   10/03/2019 0.76   10/30/2018 0.93   ]    Recent Labs   Lab 10/03/19  0628   ALT 20   AST 34        Imaging: cxr: no sig change  LE dopplers: pneumonia.      Additional cc time 40 min

## 2019-10-04 NOTE — PROGRESS NOTES
[facility]    Patients Name: Bogdan Keita  Attending Physician: Colt Antoine MD  CSN: [id number]   Location:  0052/0925-M  MRN: [id number]    YOB: 1940  Admission Date: 10/3/2019     Intubation    Called to Intubate Patient.     I

## 2019-10-04 NOTE — RESPIRATORY THERAPY NOTE
Called to room for STAT ABG, decision made per Pulm to intubate instead for increased WOB/airway protection. Intubated with 7.5 ET, taped 23 @ lip. Colormetric had good color change. Patient has equal bilateral BS.  Immediately following intubation, cezar

## 2019-10-04 NOTE — PROGRESS NOTES
[facility] Cardiology  [address] Patient Status:  Inpatient    1940 MRN [id number]   [facility] 6NE-A Attending Lauren Interiano MD   Hosp Day # 1 PCP COCO CURIEL     Subjective:  Re-intubated this morning.   [facility])    Seizure disorder ([facility]    Altered mental status, unspecified altered mental status type    Dyspnea, unspecified type    Seizure in setting of old, large MCA distribution CVA. Per neurology. Acute respiratory failure. Vent per pulmonary.     Ab

## 2019-10-04 NOTE — PROGRESS NOTES
Goal: To achieve optimal ventilation and oxygenation.     INTERVENTIONS:  • Assess for changes in respiratory status  • Assess for changes in mentation and behavior  • Position to facilitate oxygenation and minimize respiratory effort  • Oxygen supplementat Measured From [facility] by [employer] tube borja   Site Condition Dry   Req'd equipment at bedside Bag mask     Recent Labs   Lab 10/04/19  1330   ABGPHT 7.43  7.43   [id number] 28*  28*   [id number] 134*  134*   ABGHCO3 18.2*

## 2019-10-04 NOTE — CM/SW NOTE
Pt admitted due to MI. Pt had resp issues this am and is now on vent. Per chart, pt is nonverbal with R sided deficit at baseline due to L MCA cva 5/2018. Pt is bed bound-cared for at home. Sw to follow and eventually assess for dc needs .     Kerwin Rivera

## 2019-10-05 NOTE — OPERATIVE REPORT
[facility]                                                                                                        EGD Operative Report    Ezequiel Russ Patient Status:  Inpatient    1 tolerated the procedure well with no immediate complications and was transferred to the recovery area in stable condition. Findings:    ESOPHAGUS:  Normal.   STOMACH:  In the gastric cardia was a Dieulafoy lesion found without ulceration.   Large visible v

## 2019-10-05 NOTE — RESPIRATORY THERAPY NOTE
Received patient on full vent support, PRVC/AC 16/500/100%/+8. FiO2 weaned as tolerated. Patient RR tachypneic in 30s; respiratory pattern appears labored/accessory muscle use. Breath sounds are coarse bilaterally.   Suctioned a small amount of thick, ta

## 2019-10-05 NOTE — OCCUPATIONAL THERAPY NOTE
OT orders received. Per RN, Betsy Rodriguez, pt is not appropriate for skilled IP OT services. Will sign off at this time. Should pt's functional status change within admission, please provide new orders.  Thank you

## 2019-10-05 NOTE — INTERVAL H&P NOTE
Pre-op Diagnosis: Melena-bedside case    The above referenced H&P was reviewed by Eleazar Hoffmann MD on 10/5/2019, the patient was examined and no significant changes have occurred in the patient's condition since the H&P was performed.   I discussed with the pa

## 2019-10-05 NOTE — PROGRESS NOTES
Mora BROWN 2. Patient Status:  Inpatient    1940 MRN [id number]   [facility] 6NE-A Attending Katey Alfaro MD   [facility] Day # 2 PCP Fabiola Ibanez     Critical Care Progress Note     Date of Admission: 10/3/2019 Dexmedetomidine HCl in NaCl (PRECEDEX) 400 MCG/100ML premix infusion, 0.2-1.5 mcg/kg/hr (Dosing Weight), Intravenous, Continuous  •  norepinephrine (LEVOPHED) 4 mg/250 ml premix infusion, 0.5-30 mcg/min, Intravenous, Continuous  •  ipratropium-albuterol (D (77.1 kg)       I/O last 3 completed shifts: In: 994 [I.V.:894; IV PIGGYBACK:100]  Out: 1000 [Urine:1000]  I/O this shift:   In: 537.5 [Blood:537.5]  Out: -      General appearance: appears stated age and sedated, intubated, tachypneic  Lungs: coarse ant b heparin gtt on hold bc of GI bleed  4. STEMI: s/p stent to RCA.    - per cards. 5. pulm fibrosis; felt related to RA. Doubt flare.   - hold on steroids for now  6.  GI bleed- suspect UGI source  - GI source consulted  - IV PPI  - serial Hgb  - transfuse p

## 2019-10-05 NOTE — PLAN OF CARE
Assumed care of pt on 8L HF O2, withdraws to pain, having seizure like activity. Neuro APN called and at bedside, 1mg ativan given, EEG ordered and was negative for seizures.  Pulmonology paged with increased RR (high 30's - low 40's) and gurgling sound not

## 2019-10-05 NOTE — CONSULTS
[facility]    Report of Consultation    Melissa Freeman Patient Status:  Inpatient    1940 MRN [id number]   [facility] 6NE-A Attending Ligia Mccauley MD   [facility] Day # 1 PCP Rory Ahr     Date of Admission:  10/3/2019  Anival acetaminophen (TYLENOL) tab 650 mg, 650 mg, Oral, Q6H PRN **OR** acetaminophen (TYLENOL) 160 MG/5ML oral liquid 650 mg, 650 mg, Oral, Q6H PRN **OR** acetaminophen (TYLENOL) 650 MG rectal suppository 650 mg, 650 mg, Rectal, Q6H PRN  •  docusate sodium ([facility]) 50 mg in sodium chloride 0.9% 250 mL IVPB for BRIDGING, 0.75 mcg/kg/min, Intravenous, Continuous  •  influenza vaccine (PF)(FLUZONE HD) high dose for 65 yrs & older inj 0.5ml, 0.5 mL, Intramuscular, Prior to discharge  •  levETIRAcetam (KEPPRA) excluded given probable metastatic disease. 5. Mild to moderate right-sided pleural effusion. 6. Emphysematous changes within the lungs. 7. 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   There is incomplete op now.  Will check h/h q6 hours  Place NGT to LIS to see if there is a brisk upper GI bleed.   Started protonix gtt and will give this some time to work  A Nuc med bleeding scan would help to identify the location of bleed, but pt is too unstable to be transp

## 2019-10-05 NOTE — PROGRESS NOTES
[facility] Cardiology  Progress Note    Rashid Smith Patient Status:  Inpatient    1940 MRN [id number]   [facility] 6NE-A Attending Clarissa Long MD   Hosp Day # 2 PCP Juancho Ceja     Subjective:  Intubated sedat Component Value Date    TROP 1.660 10/04/2019       Recent Labs   Lab 10/03/19  0628 10/03/19  1546 10/04/19  0704 10/04/19  1747 10/05/19  0400   WBC 12.7* 17.0* 20.0* 16.9* 15.0*   HGB 11.5* 10.3* 9.5* 8.2* 10.2*   MCV 92.4 93.2 91.8 91.8 90.3   PLT 30 may represent chronic interstitial changes although superimposed infectious etiology or edema cannot be excluded. 3. Small right-sided pleural effusion with right basilar atelectasis/infiltrates.     Dictated by: Magan Shannon MD on 10/04/2019 at 15:24 fentaNYL citrate (SUBLIMAZE) 0.05 MG/ML injection 25 mcg 25 mcg Intravenous Q30 Min PRN   Or      fentaNYL citrate (SUBLIMAZE) 0.05 MG/ML injection 50 mcg 50 mcg Intravenous Q30 Min PRN   acetaminophen (TYLENOL) tab 650 mg 650 mg Oral Q6H PRN   Or      a discharge   levETIRAcetam (KEPPRA) 750 mg in sodium chloride 0.9% 100 mL IVPB 750 mg Intravenous Q12H   LORazepam (ATIVAN) injection 1 mg 1 mg Intravenous Q30 Min PRN       Allergies:    Lactose                 OTHER (SEE COMMENTS)    Comment:Lactose intol disease by CT scan, pulmonary fibrosis -Pulmonary on the case. 8.  History of stroke -large old left MCA infarct. No acute stroke by CT. as per nurse and notes apparently he is bedridden and wheelchair bound at home.   One side is flaccid and he is almo

## 2019-10-05 NOTE — PHYSICAL THERAPY NOTE
PT consult received. Pt is not appropriate for skilled IP physical therapy at this time. Will sign off. Discussed with RN.

## 2019-10-05 NOTE — PLAN OF CARE
Assumed care for this patient at 0730. Patient sedated and intubated on low dose precedex initially. Increased precedex for bedside EGD, patient's OG removed and to remain out and to remain strict NPO.  Patient breathing in mid 30s and ST in the 130s, sedat

## 2019-10-05 NOTE — PROGRESS NOTES
CLAUDIA YUAN [facility]  Neurocritical Care       Subjective: Joao Deluna is a(n) 78year old male s/p old rmca infarction, seizures came in with seizures and acute mi.   10/5/2019 - developed GI bleed from Ulcer yesterday, no more seizures on infarcts are noted within the cerebellum and old cortical infarct is noted posteriorly over the convexity in the right frontal region. Superimposed moderate chronic deep white matter ischemic changes noted in the subcortical white matter of the cerebrum. femoral vein through the distal popliteal vein. There is nonocclusive thrombus within the left posterior tibial vein. Occlusive thrombus noted within the left external iliac vein. The visualized IVC appears patent. OTHER:  Negative.       CONCLUSION:  E cardiomegaly with moderate ectasia of the thoracic aorta. 2. Again noted is extensive pulmonary fibrosis. There is resolution of the airspace disease in the left retrocardiac region.  3. There is overall decrease in the airspace disease in the right lower pneumothorax. Respiratory motion artifact limits evaluation. MEDIASTINUM/LIAN:  Prominent mediastinal lymph nodes. Within the AP window there is a 2.7 x 2 point 2 cm lymph node which has increased since prior examination when it measured 1.7 x 1.2 cm.   L called to patient's nurse Clarisa Mandel on 10/3/2019 at 7:00 p.m. Donnie Roughen Results read back was performed.     Dictated by: Mando Espinoza MD on 10/03/2019 at 18:48     Approved by: Mando Espinoza MD              Lab Review     Lab Results   Component Value Date 3.375 g Intravenous Q8H   fentaNYL citrate (SUBLIMAZE) 0.05 MG/ML injection 25 mcg 25 mcg Intravenous Q30 Min PRN   Or      fentaNYL citrate (SUBLIMAZE) 0.05 MG/ML injection 50 mcg 50 mcg Intravenous Q30 Min PRN   acetaminophen (TYLENOL) tab 650 mg 650 mg injection 1 mg 1 mg Intravenous Q30 Min PRN       Assesment/Plan:   Principal Problem:    Acute ST elevation myocardial infarction (STEMI), unspecified artery (HCC)  Active Problems:    Pulmonary fibrosis (HCC)    Acute ST elevation myocardial infarction ( the patient, family, and clinical staff. Thank you for allowing me to participate in the care of this patient.      Yenny Justin MD  Medical Director - Stroke and Neurocritical Care  [facility] - In affiliation with Loco Tan

## 2019-10-05 NOTE — SLP NOTE
Patient remains on vent; not appropriate for skilled SLP services at this time. Please re-consult if/when appropriate. Will sign off at this time.   Karyna Weiss MS CCC-SLP/L, pager [phone]  Speech-LanguagePathologist

## 2019-10-05 NOTE — H&P (VIEW-ONLY)
[facility]    Report of Consultation    Rashid Smith Patient Status:  Inpatient    1940 MRN [id number]   [facility] 6NE-A Attending Clarissa Long MD   [facility] Day # 1 PCP Juancho Ceja     Date of Admission:  10/3/2019  Anival acetaminophen (TYLENOL) tab 650 mg, 650 mg, Oral, Q6H PRN **OR** acetaminophen (TYLENOL) 160 MG/5ML oral liquid 650 mg, 650 mg, Oral, Q6H PRN **OR** acetaminophen (TYLENOL) 650 MG rectal suppository 650 mg, 650 mg, Rectal, Q6H PRN  •  docusate sodium ([facility]) 50 mg in sodium chloride 0.9% 250 mL IVPB for BRIDGING, 0.75 mcg/kg/min, Intravenous, Continuous  •  influenza vaccine (PF)(FLUZONE HD) high dose for 65 yrs & older inj 0.5ml, 0.5 mL, Intramuscular, Prior to discharge  •  levETIRAcetam (KEPPRA) excluded given probable metastatic disease. 5. Mild to moderate right-sided pleural effusion. 6. Emphysematous changes within the lungs. 7. 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   There is incomplete op now.  Will check h/h q6 hours  Place NGT to LIS to see if there is a brisk upper GI bleed.   Started protonix gtt and will give this some time to work  A Nuc med bleeding scan would help to identify the location of bleed, but pt is too unstable to be transp

## 2019-10-05 NOTE — PLAN OF CARE
Assumed care of pt @ 1930. Rec'd pt in bed, vented/sedated with Precedex. See neuro CCFS for complete neuro assessment. VSS, afebrile. Tele=ST, rate 100's. Levophed maintained per MD orders. OGT inserted with dark bile fluid return.   PRBC #1 of 2 tra

## 2019-10-06 NOTE — PROGRESS NOTES
[facility]    Progress Note    Chong Meza Patient Status:  Inpatient    1940 MRN [id number]   [facility] 6NE-A Attending Abdifatah Zamudio MD   [facility] Day # 3 PCP Danielle CURIEL     Subjective:  Chong Meza is a(n) 78 place NGT as the lesion is in a position in the gastric cardia that an NGT could knock of the clip placed. For nutrition, I would recommend TPN. Ok to do h/h q12    Continue cangalor gtt for recent RCA stent per cardiology.     If tomororw, h/h stable

## 2019-10-06 NOTE — PLAN OF CARE
Patient on vent sedated with precedex. Tachypnea 28-32/mt , using accecery muscles . Increased precedex gradually to 1.5 mcg. PRN fentanyl given. On Protonix gtt, Cangrelor gtt and levo gtt.  Updated patients condition to [facility] APN, order for Enmanuel gtt and we

## 2019-10-06 NOTE — CONSULTS
[facility] Cardiology  Progress Note    Reda Organ Patient Status:  Inpatient    1940 MRN [id number]   [facility] 6NE-A Attending Bharat Osman MD   Hosp Day # 3 PCP Jose Diana     Subjective:  Intubated sedat 10/05/19  0400 10/05/19  1311 10/05/19  2314 10/06/19  9563   WBC 17.0* 20.0* 16.9* 15.0*  --   --  17.5*   HGB 10.3* 9.5* 8.2* 10.2* 10.0* 9.8* 9.7*   MCV 93.2 91.8 91.8 90.3  --   --  93.7   .0 290.0 293.0 222.0  --   --  232.0   BAND  --   --   - Interval placement endotracheal tube with tip in adequate positioning. 2. Stable interstitial opacities bilaterally which may represent chronic interstitial changes although superimposed infectious etiology or edema cannot be excluded.   3. Small right-elizabeth (ROCEPHIN) 1 g in sodium chloride 0.9% 100 mL MBP/ADD-vantage 1 g Intravenous Q24H   EPINEPHrine PF (ADRENALIN) 1 MG/10ML injection (CARDIAC ARREST)   PRN   metoprolol Tartrate (LOPRESSOR) 5 MG/5ML injection 2.5 mg 2.5 mg Intravenous Q6H   fentaNYL citrate vaccine (PF)(FLUZONE HD) high dose for 65 yrs & older inj 0.5ml 0.5 mL Intramuscular Prior to discharge   levETIRAcetam (KEPPRA) 750 mg in sodium chloride 0.9% 100 mL IVPB 750 mg Intravenous Q12H   LORazepam (ATIVAN) injection 1 mg 1 mg Intravenous Q30 Min pulmonary emboli. Right hilar adenopathy, mediastinal, worrisome for metastatic disease by CT scan, pulmonary fibrosis -Pulmonary on the case. 8.  History of stroke -large old left MCA infarct. No acute stroke by CT.  as per nurse and notes apparently

## 2019-10-06 NOTE — PROGRESS NOTES
[facility]  Neurocritical Care       Subjective: Shell Rios is a(n) 78year old male s/p old rmca infarction, seizures came in with seizures and acute mi.   10/5/2019 - developed GI bleed from Ulcer yesterday, no more seizures on no acute hemorrhage noted. Old lacunar infarcts are noted within the cerebellum and old cortical infarct is noted posteriorly over the convexity in the right frontal region.   Superimposed moderate chronic deep white matter ischemic changes noted in the gee from the saphenofemoral junction and common femoral vein through the distal popliteal vein. There is nonocclusive thrombus within the left posterior tibial vein. Occlusive thrombus noted within the left external iliac vein.   The visualized IVC appears pa time.         CONCLUSION:  1. Stable mild cardiomegaly with moderate ectasia of the thoracic aorta. 2. Again noted is extensive pulmonary fibrosis. There is resolution of the airspace disease in the left retrocardiac region.  3. There is overall decrease i the right upper and right lower lobes. No pneumothorax. Respiratory motion artifact limits evaluation. MEDIASTINUM/LIAN:  Prominent mediastinal lymph nodes.   Within the AP window there is a 2.7 x 2 point 2 cm lymph node which has increased since prior ex examination. 8.  This critical value result was called to patient's nurse Dhruv Guzman on 10/3/2019 at 7:00 p.m. Yaneli Milligan Results read back was performed.     Dictated by: Cony Bowen MD on 10/03/2019 at 18:48     Approved by: Cony Bowen MD              Lab R Current Facility-Administered Medications:  phenylephrine in NaCl (SULMA-SYNEPHRINE) 50 mg/250 ml premix infusion SOLN 100-320 mcg/min Intravenous Continuous   CefTRIAXone Sodium (ROCEPHIN) 1 g in sodium chloride 0.9% 100 mL MBP/ADD-vantage 1 g Acacia Deng Intravenous Continuous   aspirin 300 MG rectal suppository 300 mg 300 mg Rectal Once   cangrelor tetrasodium (KENGREAL) 50 mg in sodium chloride 0.9% 250 mL IVPB for BRIDGING 0.75 mcg/kg/min Intravenous Continuous   influenza vaccine (PF)(FLUZONE HD) high 7gm/dl  Endocrine:  · Hyperglycemia protocol if req  Skin:  · Monitor for skin breakdown. DVT Prophylaxis:  · SCD’s, heparin. Goals of the Day: Keppra,  Ativan as needed for seizure activity.  GI bleed management as per GI    Thank you for allowing me to

## 2019-10-06 NOTE — PLAN OF CARE
Assumed care for this patient at 0730. Patient sedated and intubated. Neuro checks q3. Q8hr hemoglobins stable. Enmanuel gtt to keep SBP >90. RR consistently in the low 30s. Heart rate 90-110s. Renal consult completed, see orders.  Plan of care discussed with carlos

## 2019-10-06 NOTE — PROGRESS NOTES
[facility] hospitalist daily note  Seen/examined on 10/5/19    S; intubated    Mediations in Epic    PE pulse 123  107/67  Gen: intubated sedated  HEENT; intubated  CV: nl S1/S2  Pulm: no wheezing  Abd; no grimmacing with palpation  Ext: checked with RN pulses wi

## 2019-10-06 NOTE — CONSULTS
[facility]    Report of Consultation    Jay Lewis Patient Status:  Inpatient    1940 MRN [id number]   [facility] 6NE-A Attending Jessica Reynolds MD   [address] Day # 3 PCP Himanshu Running     Date of consult: 10/6/2019    LAUREL Q6H  •  fentaNYL citrate (SUBLIMAZE) 0.05 MG/ML injection 25 mcg, 25 mcg, Intravenous, Q30 Min PRN **OR** fentaNYL citrate (SUBLIMAZE) 0.05 MG/ML injection 50 mcg, 50 mcg, Intravenous, Q30 Min PRN  •  acetaminophen (TYLENOL) tab 650 mg, 650 mg, Oral, Q6H P [facility]) 50 mg in sodium chloride 0.9% 250 mL IVPB for BRIDGING, 0.75 mcg/kg/min, Intravenous, Continuous  •  influenza vaccine (PF)(FLUZONE HD) high dose for 65 yrs & older inj 0.5ml, 0.5 mL, Intramuscular, Prior to discharge  •  levETIRAcetam (KEPPRA) Component Value Date    WBC 17.5 (H) 10/06/2019    RBC 3.50 (L) 10/06/2019    HGB 9.7 (L) 10/06/2019    HCT 32.8 (L) 10/06/2019    .0 10/06/2019    MPV 10.1 (H) 07/30/2012    MCV 93.7 10/06/2019    MCH 27.7 10/06/2019    MCHC 29.6 (L) 10/06/2019 Jeancarlos Sin MD                ASSESSMENT/PLAN:   Jay Lewis is a a(n) 78year old male w pmh pulm fibrosis, CVA w R sided hemiparesis, seizure disorder, RA who presented initially on 10/3 with seizure.  Complicated hospital course including STEMI

## 2019-10-06 NOTE — PROGRESS NOTES
Mora BROWN 2. Patient Status:  Inpatient    1940 MRN [id number]   [facility] 6NE-A Attending Brijesh Stoddard MD   [facility] Day # 3 PCP Irish Montero     Critical Care Progress Note     Date of Admission: 10/3/2019 Chlorhexidine Gluconate (PERIDEX) 0.12 % solution 15 mL, 15 mL, Mouth/Throat, [email or web address]  •  propofol (DIPRIVAN) infusion, 5-100 mcg/kg/min (Dosing Weight), Intravenous, Continuous  •  Dexmedetomidine HCl in NaCl (PRECEDEX) 400 MCG/100ML premix infusion (78 kg)  09/06/18 : 170 lb (77.1 kg)       I/O last 3 completed shifts: In: 2651.5 [I.V.:1959; Blood:537.5; Other:55; IV PIGGYBACK:100]  Out: 100 [Emesis/NG output:100]  No intake/output data recorded.      General appearance: appears older than stated age bleeding noted. However, OGT was removed after EGD  - s/p EGD with clipping and cauterizing of bleeding vessel, Dieulafoy lesion  - GI following  - IV PPI  - serial Hgb q8hr  - transfuse prn for Hgb <7 or active bleeding  7.  Shock- secondary to GI loss, s

## 2019-10-06 NOTE — PROGRESS NOTES
[facility] hospitalist daily note  Seen/examined on 10/6/19     S; intubated  Son and daughter in law at bedside.  Updated on pt;s medical condition      Mediations in Epic     PE    10/06/19  1000   BP: 103/66   Pulse: 93   Resp: (!) 31   Temp:      Gen: intubate NPO    #abnormal CT chest on admit  Including but not limited to lytic lesion T11. Pt is currently on Pressor support, intubated.  Not safe for transport MRI at this time  Pt is critically ill, respiratory failure and multiple problems described above  Also

## 2019-10-07 PROBLEM — I25.10 CORONARY ARTERY DISEASE INVOLVING NATIVE CORONARY ARTERY OF NATIVE HEART: Status: ACTIVE | Noted: 2019-01-01

## 2019-10-07 PROBLEM — R94.31 ABNORMAL EKG: Status: ACTIVE | Noted: 2019-01-01

## 2019-10-07 PROBLEM — Z95.5 STATUS POST INSERTION OF DRUG-ELUTING STENT INTO RIGHT CORONARY ARTERY FOR CORONARY ARTERY DISEASE: Status: ACTIVE | Noted: 2019-01-01

## 2019-10-07 PROBLEM — K92.2 ACUTE UPPER GI BLEEDING: Status: ACTIVE | Noted: 2019-01-01

## 2019-10-07 PROBLEM — I69.359 CVA, OLD, HEMIPARESIS (HCC): Chronic | Status: ACTIVE | Noted: 2019-01-01

## 2019-10-07 NOTE — PROGRESS NOTES
Mora BROWN 2. Patient Status:  Inpatient    1940 MRN [id number]   [facility] 6NE-A Attending Julienne John MD   [facility] Day # 4 PCP Jocy Orourke     Critical Care Progress Note     Date of Admission: 10/3/2019 0.12 % solution 15 mL, 15 mL, Mouth/Throat, [email or web address]  •  propofol (DIPRIVAN) infusion, 5-100 mcg/kg/min (Dosing Weight), Intravenous, Continuous  •  Dexmedetomidine HCl in NaCl (PRECEDEX) 400 MCG/100ML premix infusion, 0.2-1.5 mcg/kg/hr (Dosing Weight [Urine:1000; Emesis/NG output:100]  I/O this shift:  In: 1677 [I.V.:3460;  Other:20]  Out: 525 [Urine:125; Stool:400]      Physical Exam:                          KXMIGKJ: PATTWLKQW, sedated                          HEENT: ETT in place                       with diffuse slowing but no definite active seizures  - neuro following. 3. DVT / PE:   - heparin gtt on hold bc of GI bleed  4. STEMI: s/p stent to RCA.    - per cards. 5. pulm fibrosis; felt related to RA. Doubt flare.   - hold on steroids for now  6.

## 2019-10-07 NOTE — PLAN OF CARE
Assumed care of patient at 1. Patient is intubated and sedated. Neuro q3hr, see neuro flowsheet for detailed assessment. Upon receiving patient no left pedal pulse found with doppler. Audible strong left post tib and popliteal present.  Kulwant Goldberg

## 2019-10-07 NOTE — PROGRESS NOTES
[facility] Cardiology  Progress Note    Yolanda Eddy Patient Status:  Inpatient    1940 MRN [id number]   [facility] 6NE-A Attending Girish Girard MD   Hosp Day # 4 PCP Lianet CURIEL     Subjective:  Events of weekend reviewed wit Pulmonary fibrosis (HCC)    Altered mental status, unspecified altered mental status type    Dyspnea, unspecified type    Coronary artery disease involving native coronary artery of native heart    Status post insertion of drug-eluting stent into right cor

## 2019-10-07 NOTE — PROGRESS NOTES
[facility] hospitalist daily note  Seen/examined on 10/7/19     S; intubated       Mediations in Epic     PE    10/07/19  1543   BP:    Pulse: 103   Resp: (!) 28   Temp:      Gen: intubated but eyes open, lethargi, not following commands  HEENT; intubated  CV: nl hemiparesis   - old infarct seen on CT brain  - neuro following during admit     #Pulmonary fibrosis  Intubated at this time  - pulm following     TATYANA, acidosis; consulted renal, appreciate input         Hypokalemia; replace per protocol     #RA  - hold pr

## 2019-10-07 NOTE — PROGRESS NOTES
[facility]    Nephrology Progress Note    Arik Lewis Attending:  Herman Snell MD     Cc: TATYANA    SUBJECTIVE     Remains intubated on MV  Cr worsening, worsening acidosis  Off pressors this afternoon  SVT run this afternoon    PHYSICAL EXAM PRN   metoprolol Tartrate (LOPRESSOR) 5 MG/5ML injection 2.5 mg 2.5 mg Intravenous Q6H   fentaNYL citrate (SUBLIMAZE) 0.05 MG/ML injection 25 mcg 25 mcg Intravenous Q30 Min PRN   Or      fentaNYL citrate (SUBLIMAZE) 0.05 MG/ML injection 50 mcg 50 mcg Intr 10/07/2019     10/07/2019    CO2 14.0 10/07/2019    GLU 91 10/07/2019    CA 7.7 10/07/2019    ALB 1.4 10/07/2019    MG 1.9 10/07/2019    PHOS 4.3 10/07/2019       IMAGING   All imaging studies personally reviewed.         ASSESSMENT & PLAN   Ashleigh Doan

## 2019-10-07 NOTE — PLAN OF CARE
Assumed care of patient at [address] [place] [address]. Patient intubated and unresponsive. Patient withdrawals to pain and pupils reactive. Further neurological assessment unable to complete. Sedation restarted due to tachypnea. Goal MAP > 60, chantale titrated to maintain goal MAP.

## 2019-10-07 NOTE — CM/SW NOTE
10/07/19 1400   CM/SW Referral Data   Referral Source Physician   Reason for Referral Discharge planning   Informant Children  (Cristina Bai)   Patient Info   Patient Communication Issues Non-verbal   Patient's Home Environment House   Patient lives with Chil

## 2019-10-07 NOTE — PROGRESS NOTES
[facility]  Neuro Critical Care Progress Note    Yolanda Eddy Patient Status:  Inpatient    1940 MRN [id number]   [facility] 6NE-A Attending Girish Girard MD   [facility] Day # 4 PCP COCO CURIEL     Subjective:  No more seizu EPINEPHrine PF (ADRENALIN) 1 MG/10ML injection (CARDIAC ARREST), , , PRN  •  metoprolol Tartrate (LOPRESSOR) 5 MG/5ML injection 2.5 mg, 2.5 mg, Intravenous, Q6H  •  fentaNYL citrate (SUBLIMAZE) 0.05 MG/ML injection 25 mcg, 25 mcg, Intravenous, Q30 Min PRN (PF)(FLUZONE HD) high dose for 65 yrs & older inj 0.5ml, 0.5 mL, Intramuscular, Prior to discharge  •  LORazepam (ATIVAN) injection 1 mg, 1 mg, Intravenous, Q30 Min PRN    Lab Data Review:  Lab Results   Component Value Date    WBC 19.6 10/07/2019    HGB 9

## 2019-10-07 NOTE — PALLIATIVE CARE NOTE
Received request for palliative care consultation from Dr. Jacey Loera. Case reviewed in AM ICU rounds and briefly discussed with RN Frances Su and Daily Shah.      Received call from Mr. Erickson Taylor, pt's dtr Malinda James requesting that all of pt's children be included i

## 2019-10-07 NOTE — PLAN OF CARE
Patient received on fulls support 16/500/40%/+5. Weaning not appropriate at this time due to ABG's this am will attempt tomorrow. Nebs Q4. Diminished slightly coarse. Will continue to monitor.

## 2019-10-08 PROBLEM — Z71.89 COUNSELING REGARDING ADVANCE CARE PLANNING AND GOALS OF CARE: Status: ACTIVE | Noted: 2019-01-01

## 2019-10-08 NOTE — PLAN OF CARE
Assumed care of pt 0730. Pt is on light sedation and not oriented (see NEURO assessment). BP low- metoprolol discountinued per MD order. RR high 20s, Bilat lungs rhonchi. Moderate thick secretions from ETT. CPOT intubated- no pain. Voids via schultz.  Pt has

## 2019-10-08 NOTE — PROGRESS NOTES
[facility]  Progress Note    Abhishek Peace Patient Status:  Inpatient    1940 MRN [id number]   [facility] 6NE-A Attending Jennifer Marshall MD   [facility] Day # 5 PCP Lj CURIEL       Subjective:  Spontaneously opens eyes, does secretions  Abd: soft, + bs  Ext: no sign edema. Feet/toes cyantoic but fairly warm. dplr pt  Skin: Warm, dry  Neuro: eyes open, no purposeful responses      Labs:  Lab Results   Component Value Date    HGB 7.7 10/08/2019    CREATSERUM 2.07 10/08/2019    BU and nonverbal at baseline    Plan:     · Palliative care to meet with family to disucss goals of care  · Continue iv amio, cangrelor until able to gain oral access. Will leave off IV lopressor for now, given marginal BP reading.       Discussed plan of car

## 2019-10-08 NOTE — PROGRESS NOTES
[facility]    Progress Note    Shell Rios Patient Status:  Inpatient    1940 MRN [id number]   [facility] 6NE-A Attending JuanA Askew MD   [facility] Day # 5 PCP Hiral CURIEL     Subjective:  Shell Rios is a(n) 78 mental status type     Dyspnea, unspecified type     Coronary artery disease involving native coronary artery of native heart     Status post insertion of drug-eluting stent into right coronary artery for coronary artery disease     Acute upper GI bleeding

## 2019-10-08 NOTE — DIETARY MALNUTRITION NOTE
[facility]    NUTRITION INITIAL ASSESSMENT    Pt meets severe malnutrition criteria.     CRITERIA FOR MALNUTRITION DIAGNOSIS:  Criteria for severe malnutrition diagnosis: chronic illness related to wt loss greater than 20% in 1 year and energy intake l closely. 42lb wt loss over past year. Pt is currently intubated and sedated. 78year old male. Pt admitted with MI and s/p cardiac cath with RCA stent now on antiplatelet therapy. Also had seizure on admission. Hx of CVA and also pulm fibrosis.  EGD

## 2019-10-08 NOTE — PROGRESS NOTES
10/08/19 1325   Vent Information   $ RT Standby Charge (per 15 min) 1   Ventilator Initiation 10/04/19   Ventilation Day(s) 5   Interface Invasive   Vent Type Si   Vent ID 4   Vent Mode PRVC/AC   Settings   FiO2 (%) 40 %   Resp Rate (Set) 16   Vt (Set,

## 2019-10-08 NOTE — PROGRESS NOTES
[facility] hospitalist daily note  Seen/examined on 10/8/19     S; intubated  Checked with RN, no active bleed seen, stool brown        Mediations in Epic     PE    10/08/19  1738   BP:    Pulse: 89   Resp: 25   Temp:      Gen: intubated,not following commands  H high risk for re-bleeding per GI, NPO for now.  Consult dietician  for TPN  Given the drop in Hg without obvious source of bleed get CT abd without contrast     Shock likely due to GI bleed, STEMI  Was on pressor during this admit     #Seizures  - neuro con

## 2019-10-08 NOTE — PROGRESS NOTES
Mora BROWN 2. Patient Status:  Inpatient    1940 MRN [id number]   [facility] 6NE-A Attending Naga Verduzco MD   [facility] Day # 5 PCP Rory [facility]     Critical Care Progress Note     Date of Admission: 10/3/2019 Min PRN **OR** fentaNYL citrate (SUBLIMAZE) 0.05 MG/ML injection 50 mcg, 50 mcg, Intravenous, Q30 Min PRN  •  acetaminophen (TYLENOL) tab 650 mg, 650 mg, Oral, Q6H PRN **OR** acetaminophen (TYLENOL) 160 MG/5ML oral liquid 650 mg, 650 mg, Oral, Q6H PRN **OR 15.3 oz (78 kg)  09/06/18 : 170 lb (77.1 kg)       I/O last 3 completed shifts: In: 6131.5 [I.V.:5419; Blood:537.5; Other:75; IV PIGGYBACK:100]  Out: 925 [Urine:425; Emesis/NG output:100; CNATL:036]  I/O this shift:   In: 4219 [I.V.:3817; IV [id number]:027] bleeding noted.  However, OGT was removed after EGD  - s/p EGD with clipping and cauterizing of bleeding vessel, Dieulafoy lesion  - GI following  - IV PPI  - serial Hgb q8hr - now stable  - transfuse prn for Hgb <7 or active bleeding  7.  Shock- secondary

## 2019-10-08 NOTE — PROGRESS NOTES
10/07/19 1543   Vent Information   $ RT Standby Charge (per 15 min) 1   Ventilator Initiation 10/04/19   Ventilation Day(s) 4   Interface Invasive   Vent Type Si   Vent ID 4   Vent Mode PRVC/AC   Settings   FiO2 (%) 40 %   Resp Rate (Set) 16   Vt (Set,

## 2019-10-08 NOTE — CONSULTS
[address] [facility]  [id number]  Hospital Day #5  Date of Consult: 10/08/19  Patient seen at: [facility]    Reason for Consultation:      Consult requested by Dr Marleta Sacks for [facility] unresponsive  Occupational History: did not explore    Anglican/Cultural Information:  Anglican Affiliation: Quaker    Functional Status: w/c and bed bound prior to admission    Substance History     Smoking status former smoker and former user of smok Intravenous, Q30 Min PRN  •  acetaminophen (TYLENOL) tab 650 mg, 650 mg, Oral, Q6H PRN **OR** acetaminophen (TYLENOL) 160 MG/5ML oral liquid 650 mg, 650 mg, Oral, Q6H PRN **OR** acetaminophen (TYLENOL) 650 MG rectal suppository 650 mg, 650 mg, Rectal, Q6H Date    CREATSERUM 2.07 (H) 10/08/2019    BUN 48 (H) 10/08/2019     10/08/2019    K 2.4 (LL) 10/08/2019     (H) 10/08/2019    CO2 19.0 (L) 10/08/2019     (H) 10/08/2019    CA 7.0 (L) 10/08/2019    ALB 1.1 (L) 10/08/2019    ALKPHO 148 (H) Extensive Disease  Can’t do any work Mainly Assist Normal or Reduced Full or confused   30 Bedbound Extensive Disease  Can’t do any work Max Assist  Total Care Reduced Drowsy/confused   20 Bedbound Extensive Disease  Can’t do any work Max Assist  Total Car course and the other path which is to continue what we are doing however we must also consider his QOL. We talked about Mr Wil Macdonald being very critically ill.  If he were to survive this hospital admission, we would need to talk about trach and peg placements heart        Status post insertion of drug-eluting stent into right coronary artery for coronary artery disease        Acute upper GI bleeding        CVA, old, hemiparesis ([facility]        Abnormal EKG        Counseling regarding advance care planning and goal they make any changes to his code status or overall medical care. I gave them my cell phone number [phone] if they wanted to call and discuss more about Mr Maggy Palma situation and if there are any questions.     The family agree that I should call Naif

## 2019-10-08 NOTE — PROGRESS NOTES
[facility]    Progress Note    Robbie Miranda Patient Status:  Inpatient    1940 MRN [id number]   [facility] 6NE-A Attending Caroline Oneil MD   [facility] Day # 5 PCP Eric CURIEL     Subjective:  Robbie Miranda is a(n) 78 ([facility]     Acute ST elevation myocardial infarction (STEMI), unspecified artery (HCC)     Seizure disorder (HCC)     Altered mental status, unspecified altered mental status type     Dyspnea, unspecified type     Coronary artery disease involving native cor

## 2019-10-09 NOTE — CM/SW NOTE
Discussed pt with CNICU RN- Palliative Care following. Son Katie Beyer wants to progress with full care. Martha Welch, [facility] /Dischage Planner  [phone]  Pager [phone]

## 2019-10-09 NOTE — PROGRESS NOTES
[facility] Hospitalist Progress Note     Nasrinsolange Eddy Patient Status:  Inpatient    1940 MRN [id number]   [facility] 6NE-A Attending Efren Schwartz MD   Hosp Day # 6 PCP Pito Walden     CC: follow up    SUBJECTIVE:  Intubated, unr 10/03/19  0628 10/07/19  0400 10/08/19  0530 10/09/19  0416   ALT 20  --   --  24   AST 34  --   --  48*   ALB 2.2* 1.4* 1.1* 1.1*       Recent Labs   Lab 10/03/19  0625 10/08/19  2357 10/09/19  0541 10/09/19  1157 10/09/19  1202   PGLU 147* 115* 123* 61* able    # Acute PE, b/l DVTs  - hep gtt held due to GI bleed  - Dr. Flor Low d/w son the possibility of IVC filter placement - son declined.     # GI bleed  - s/p clipping and cauterization  - GI following- possible repeat scope ?tomorrow  - trend Hgb    # Sh

## 2019-10-09 NOTE — PLAN OF CARE
Dr. Dian Ford and Dr. Ginger Damon aware of US results and left leg. Dr. Dian Ford will discuss with Vascular surgery. Dr. Ginger Damon would like nurse to page GI and see if we can restart heparin.

## 2019-10-09 NOTE — PROGRESS NOTES
Mora BROWN 2. Patient Status:  Inpatient    1940 MRN [id number]   [facility] Attending Isaiah Khan MD   [address] Day # 6 PCP Travis Mi / Critical Care Progress Note     S: remains intubated. PIGGYBACK:241.9]  Out: 1500 [Urine:1500]     Physical Exam:   General: intubated   Head: Normocephalic, without obvious abnormality, atraumatic. Lungs: cta anteriorly   Chest wall: No tenderness or deformity.    Heart: Regular rate and rhythm, normal S1S2 fibrosis; felt related to RA. Doubt flare.   - hold on steroids for now  6.  GI bleed- no further bleeding noted.  However, OGT was removed after EGD  - s/p EGD with clipping and cauterizing of bleeding vessel, Dieulafoy lesion  - GI following  - IV PPI  -

## 2019-10-09 NOTE — PROGRESS NOTES
[address] [facility] Follow Up    Reji Beach  [id number]  Hospital Day #6  Date of Consult: 10/08/19  Patient seen at: [facility] Room 2380    Subjective:      Patient was seen and examined with no one else at the bedside.    I talke infusion, 1 mg/min, Intravenous, Continuous **FOLLOWED BY** Amiodarone HCl (CORDARONE) 450 mg in dextrose 5 % 250 mL infusion, 0.5 mg/min, Intravenous, Continuous  •  EPINEPHrine PF (ADRENALIN) 1 MG/10ML injection (CARDIAC ARREST), , , PRN  •  fentaNYL cit Q30 Min PRN    No current outpatient medications on file.     Labs/ imaging     Hematology:  Lab Results   Component Value Date    WBC 19.6 (H) 10/07/2019    HGB 7.6 (L) 10/09/2019    HCT 32.5 (L) 10/07/2019    .0 (L) 10/07/2019       Coags:  Lab Res Us Arterial Duplex Lower Extremity Left (cpt=93926)    Result Date: 10/9/2019  CONCLUSION:  1. Left proximal anterior tibial artery is occluded .   The left dorsalis pedis is not visualized and great toe pressure not obtained may also be due to occlusio confused   30 Bedbound Extensive Disease  Can’t do any work Max Assist  Total Care Reduced Drowsy/confused   20 Bedbound Extensive Disease  Can’t do any work Max Assist  Total Care Minimal Drowsy/confused   10 Bedbound/coma Extensive Disease  Can’t do any included all of the following: direct face to face contact, physical examination and >50% was spent counseling and coordinating care. We will continue to follow. Cristian Nuñez MD   10/9/2019  1:12 PM

## 2019-10-09 NOTE — PROGRESS NOTES
[facility]    Nephrology Progress Note    Leonor Ortega Attending:  Barrington Bell MD     Cc: TATYANA    SUBJECTIVE     Remains intubated on MV  No other events noted    PHYSICAL EXAM   Vital signs: /82 (BP Location: Right arm)   Pulse 102   Te injection 25 mcg 25 mcg Intravenous Q30 Min PRN   Or      fentaNYL citrate (SUBLIMAZE) 0.05 MG/ML injection 50 mcg 50 mcg Intravenous Q30 Min PRN   acetaminophen (TYLENOL) tab 650 mg 650 mg Oral Q6H PRN   Or      acetaminophen (TYLENOL) 160 MG/5ML oral liq 10/09/2019    BILT 0.4 10/09/2019    TP 5.2 10/09/2019    AST 48 10/09/2019    ALT 24 10/09/2019    MG 1.6 10/09/2019    PHOS 2.2 10/09/2019    PGLU 107 10/09/2019       IMAGING   All imaging studies personally reviewed.     PROCEDURE:  US KIDNEYS (CPT=7677 Acidosis  -- lactate wnl. Improved w hypotonic bicarb. Continue     Hypernatremia  -- hypotonic bicarb as above    Hypokalemia  -- repleting, monitor and recheck     Shock  -- Off pressors .  Monitor      Acute resp failure  -- due to PNA + PE in settin

## 2019-10-09 NOTE — PROGRESS NOTES
[facility]    Nephrology Progress Note    Jay Lewis Attending:  Jessica Reynolds MD     Cc: TATYANA    SUBJECTIVE     Remains intubated on MV  Started on TPN    PHYSICAL EXAM   Vital signs: /82 (BP Location: Right arm)   Pulse 102   Temp 98. 4 mcg 25 mcg Intravenous Q30 Min PRN   Or      fentaNYL citrate (SUBLIMAZE) 0.05 MG/ML injection 50 mcg 50 mcg Intravenous Q30 Min PRN   acetaminophen (TYLENOL) tab 650 mg 650 mg Oral Q6H PRN   Or      acetaminophen (TYLENOL) 160 MG/5ML oral liquid 650 mg 65 BILT 0.4 10/09/2019    TP 5.2 10/09/2019    AST 48 10/09/2019    ALT 24 10/09/2019    MG 1.6 10/09/2019    PHOS 2.2 10/09/2019    PGLU 107 10/09/2019       IMAGING   All imaging studies personally reviewed.     PROCEDURE:  US KIDNEYS (CPT=76775)     COMP Acidosis  -- lactate wnl. Improved w hypotonic bicarb. Now on TPN, adjust acetate prn    Hypernatremia  -- will adjust tpn as needed    Hypokalemia  -- replete prn     Shock  -- Off pressors .  Monitor      Acute resp failure  -- due to PNA + PE in sett

## 2019-10-09 NOTE — DIETARY NOTE
Clinical Nutrition -  Parenteral Nutrition   TPN ordered for tonight and will provide 675 dextrose calories, 400 lipid calories, 30% lipids, 70 grams protein in 2400 ml fluid to meet ~75% pt nutritional  needs.  Pt may be at refeeding risk, therefore approp

## 2019-10-09 NOTE — PROGRESS NOTES
[facility]  Progress Note    Jay Lewis Patient Status:  Inpatient    1940 MRN [id number]   [facility] 6NE-A Attending Jorge Nash MD   [facility] Day # 6 PCP COCO CURIEL       Assessment and Plan:    1.  CV -status pos 11   < > 47* 48*  --  45* 41*   CREATSERUM 0.76   < > 1.94* 2.07*  --  2.03* 2.01*   GFRAA 100   < > 37* 34*  --  35* 35*   GFRNAA 87   < > 32* 30*  --  30* 31*   CA 9.1   < > 7.7* 7.0*  --  6.6* 6.5*   ALB 2.2*  --  1.4* 1.1*  --   --  1.1*   *   < [email or web address]   • ipratropium-albuterol  3 mL Nebulization 6 times per day   • sodium chloride  3 mL Nebulization TID   • aspirin  300 mg Rectal Once           Karl Gonzalez MD

## 2019-10-09 NOTE — RESPIRATORY THERAPY NOTE
Received pt vented, PRVC 16/500/40%/+5. Duoneb Q4 and 3% Nacl TID via MetaNeb. Suctioning moderate amount of thick yellow secretions from ETT. Will continue to monitor closely.

## 2019-10-09 NOTE — RESPIRATORY THERAPY NOTE
Received pt on PRVC/AC 16/500/40%/+5, metaneb done with Duoneb Q4 and 3% NaCl TID. No changes made at this time, no weaning done at this time.  Uneventful trip to CT scan on transport ventilator with same settings and returned to previous vent with same set

## 2019-10-09 NOTE — PLAN OF CARE
Pt received intubated and lightly sedated on full vent support. Pt will move left extremities minimally but not to command. Muscle twitches noted in right lower extremity at times.  Lungs coarse bilaterally, suctioning large amt thick yellow sputum from ETT

## 2019-10-09 NOTE — PLAN OF CARE
Does not open eyes to commands , Precedex off since 0830 am. Tolerating vent settings. Amio cangrelor protonix TPN infusing. GI spoke with Son about scope was for today and canceled till am. Son updated. Daughter @ bedside updated care plan.  Continue suppo injury  Description  INTERVENTIONS:  - Assess pt frequently for physical needs  - Identify cognitive and physical deficits and behaviors that affect risk of falls.   - [place] fall precautions as indicated by assessment.  - Educate pt/family on patient sa

## 2019-10-09 NOTE — PROGRESS NOTES
[facility]    Progress Note    Arik Lewis Patient Status:  Inpatient    1940 MRN [id number]   [facility] 6NE-A Attending Carmen Barragan MD   [address] Day # 6 PCP COCO CURIEL     Subjective:  Arik Lewis is a(n) 78 factor (HCC)     Dehydration     Anemia, unspecified type     Severe hypotension     Palliative care encounter     Goals of care, counseling/discussion     Acute ST elevation myocardial infarction (STEMI) (HCC)     Acute ST elevation myocardial infarction

## 2019-10-10 NOTE — PROGRESS NOTES
[facility]  Neuro Critical Care Progress Note    Abhishek Peace Patient Status:  Inpatient    1940 MRN [id number]   [facility] 6NE-A Attending Jennifer Marshall MD   [facility] Day # 7 PCP COCO CURIEL     Subjective:  Suspected eye infusion, 1 mg/min, Intravenous, Continuous **FOLLOWED BY** Amiodarone HCl (CORDARONE) 450 mg in dextrose 5 % 250 mL infusion, 0.5 mg/min, Intravenous, Continuous  •  EPINEPHrine PF (ADRENALIN) 1 MG/10ML injection (CARDIAC ARREST), , , PRN  •  fentaNYL cit Q30 Min PRN    Lab Data Review:  Lab Results   Component Value Date    WBC 11.6 10/10/2019    HGB 7.7 10/10/2019    HCT 25.7 10/10/2019    PLT 84.0 10/10/2019    CREATSERUM 1.91 10/10/2019    BUN 37 10/10/2019     10/10/2019    K 4.1 10/10/2019    CL

## 2019-10-10 NOTE — PROGRESS NOTES
[facility]    Nephrology Progress Note    Chong Meza Attending:  Abdifatah Zamudio MD     Cc: TATYANA    SUBJECTIVE     Remains intubated on MV  Good UOP  EGD today    PHYSICAL EXAM   Vital signs: /72   Pulse 89   Temp (!) 96.4 °F (35.8 °C) (Te EPINEPHrine PF (ADRENALIN) 1 MG/10ML injection (CARDIAC ARREST)   PRN   fentaNYL citrate (SUBLIMAZE) 0.05 MG/ML injection 25 mcg 25 mcg Intravenous Q30 Min PRN   Or      fentaNYL citrate (SUBLIMAZE) 0.05 MG/ML injection 50 mcg 50 mcg Intravenous Q30 Min PTT 76.1 10/10/2019    INR 1.45 10/10/2019    PTP 18.4 10/10/2019    MG 1.7 10/10/2019    PHOS 4.2 10/10/2019    PGLU 127 10/10/2019       IMAGING   All imaging studies personally reviewed. PROCEDURE:  US KIDNEYS (JIQ=13175)     COMPARISON:  None.      I Acidosis  -- lactate wnl. Improved w hypotonic bicarb. Now on TPN, adjust acetate prn    Hypernatremia  -- will adjust tpn as needed    Hypokalemia  -- replete prn     Shock  -- Off pressors .  Monitor      Acute resp failure  -- due to PNA + PE in sett

## 2019-10-10 NOTE — PROGRESS NOTES
[facility] Hospitalist Progress Note     Zeferinocherri Green Patient Status:  Inpatient    1940 MRN [id number]   [facility] 6NE-A Attending Marisol Han MD   Hosp Day # 7 PCP Irish Montero     CC: follow up    SUBJECTIVE:  Intubated, unr Recent Labs   Lab 10/07/19  0400 10/08/19  0530 10/09/19  0416 10/10/19  0316   ALT  --   --  24 20   AST  --   --  48* 36   ALB 1.4* 1.1* 1.1* 1.1*       Recent Labs   Lab 10/09/19  1157 10/09/19  1202 10/09/19  1743 10/09/19  1574 10/10/19  0638 sputum cx +hflu  - IV ceftriaxone   - wean vent as able    # Acute PE, b/l DVTs  - hep gtt held due to GI bleed  - Dr. Amanda Jose d/w son the possibility of IVC filter placement - son declined.   If unable to resume hep gtt pending EGD today we will need to yousuf discuss with on coming hospitalist. I d/w SAM.

## 2019-10-10 NOTE — PROGRESS NOTES
[facility]  Progress Note    Robbie Miranda Patient Status:  Inpatient    1940 MRN [id number]   [facility] 6NE-A Attending Diane Curran MD   [facility] Day # 7 PCP Eric CURIEL       Subjective:  Patient unresponsive and intuba distal pulses  Skin: Warm, dry; bilateral lower extremities mottled from mid calf to feet, bilateral dorsum feet with deep purple discoloration;    Neuro: Unresponsive, contracted bilateral upper extremities    Labs:  Lab Results   Component Value Date    W despite iv amio. 4. Extensive bilateral lower extremity DVT s/p recurrent GI bleeding on iv heparin. High risk to clot off ivc filter per dr Eliot Pugh  5.  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

## 2019-10-10 NOTE — H&P
History & Physical Examination    Patient Name: Wil Bran  MRN: [id number]  [facility]: [id number]  YOB: 1940    Diagnosis: Pulmonary fibrosis ([facility] [J84.10]  Seizure disorder ([facility] [G40.909]  Acute ST elevation myocardial infarction (STEMI) mL IVPB for BRIDGING 0.75 mcg/kg/min Intravenous Continuous   Amiodarone HCl (CORDARONE) 450 mg in dextrose 5 % 250 mL infusion 0.5 mg/min Intravenous Continuous   EPINEPHrine PF (ADRENALIN) 1 MG/10ML injection (CARDIAC ARREST)   PRN   fentaNYL citrate (KNIGHT Ad Sage MD at [facility] ENDOSCOPY     History reviewed. No pertinent family history.   Social History    Tobacco Use      Smoking status: Former Smoker      Smokeless tobacco: Former User    Alcohol use: No      Frequency: Never      SYSTEM Check if Review is

## 2019-10-10 NOTE — PROGRESS NOTES
[address] [address] Follow Up    Bogdan Keita  [id number]  Hospital Day #7  Date of Consult: 10/08/19  Patient seen at: [facility] Room 4400    Subjective:      Patient was seen and examined with no one else at the bedside.    Intubat in dextrose 5 % 100 mL bolus, 150 mg, Intravenous, Once **FOLLOWED BY** [] Amiodarone HCl (CORDARONE) 450 mg in dextrose 5 % 250 mL infusion, 1 mg/min, Intravenous, Continuous **FOLLOWED BY** Amiodarone HCl (CORDARONE) 450 mg in dextrose 5 % 250 mL Hematology:  Lab Results   Component Value Date    WBC 11.6 (H) 10/10/2019    HGB 7.7 (L) 10/10/2019    HCT 25.7 (L) 10/10/2019    PLT 84.0 (L) 10/10/2019       Coags:  Lab Results   Component Value Date    PT 13.8 07/30/2012    INR 1.45 (H) 10/10/2019 Dictated by: Kelsey Proctor MD on 10/09/2019 at 9:44     Approved by: Kelsey Proctor MD            Xr Chest Ap Portable  (cpt=71045)    Result Date: 10/10/2019  CONCLUSION:  The NG tube tip is in the body of the stomach.      Dictated by: Portillo Rose MD on 1 [address] [place] [address] Rd Agent's Phone Number: [phone]     Spiritual needs addressed: Unable to assess  Disposition: ongoing goals of care discussions    Problem List       Seizure disorder [facility])        Pulmonary fibrosis ([facility]        Altered mental status,

## 2019-10-10 NOTE — CM/SW NOTE
SAM was informed by CNICU RN that Dr. Geni Culver would like a Care Conference tomorrow. SW spoke to pt's son- he will reach out to his siblings and get back to me with a time for tomorrow. Martha Bower  /Dischage Planner  [phone]

## 2019-10-10 NOTE — PROGRESS NOTES
Mora BROWN 2. Patient Status:  Inpatient    1940 MRN [id number]   [facility] 6NE-A Attending Anthony Hamilton MD   Hosp Day # 7 PCP [facility] / Critical Care Progress Note     S: overnight nurse con 35559.5 [I.V.:61508.7; Other:20; IV PIGGYBACK:548.9]  Out: 0436 [Urine:2770; Stool:400]  I/O this shift: In: 4204 [I.V.:1620; IV PIGGYBACK:233]  Out: 3400 [Urine:2500; Stool:900]     Physical Exam:   General: intubated.     Head: Normocephalic, without obv RA. Doubt flare.   - hold on steroids for now  6.  GI bleed- no further bleeding noted.  However, OGT was removed after EGD  - s/p EGD with clipping and cauterizing of bleeding vessel, Dieulafoy lesion  - GI following  - IV PPI  - concern for ongoing slow b

## 2019-10-10 NOTE — OPERATIVE REPORT
OPERATIVE REPORT   PATIENT NAME: Nathan Flynn  MRN: [id number]  DATE OF OPERATION: 10/10/2019  PREOPERATIVE DIAGNOSIS: GI bleeding from dieulafoy's lesion with drops in hemoglobin without overt bleeding  POSTOPERATIVE DIAGNOSIS:    1.   Clean based 1 c Thank you very much for the consultation. I really appreciate it.     Elba Crawford MD

## 2019-10-10 NOTE — RESPIRATORY THERAPY NOTE
Received pt vented, PRVC 16/500/40%/+5, tolerating well. No changes made. Duoneb Q4, 3% NaCl TID. Suctioning large amounts of secretions from ETT at beginning of shift, has since decreased. Will continue to monitor closely.

## 2019-10-10 NOTE — PLAN OF CARE
Assumed care of patient at [address] [place] [address]. Patient intubated and sedated. Pupils equal and reactive. Patient with minimal movement to pain. Patient occasionally opens eyes to pain but does not track or make eye contact.  VSS and patient not showing any indication of

## 2019-10-10 NOTE — PROGRESS NOTES
Assessment and Objective  \"Progressing\"  INTERVENTIONS:  - Assess for changes in respiratory status  - Assess for changes in mentation and behavior  - Position to facilitate oxygenation and minimize respiratory effort  - Oxygen supplementation based on o Verification: Auscultation;Colorimetric EtCO2 device;Symmetrical chest wall movement  Placed By: Anesthesiologist   Secured at (cm) 23 cm   Suctioned?  N   Measured From Lips   Secured Location Left   Secured by Commercial tube borja   Site Condition Dry

## 2019-10-10 NOTE — PLAN OF CARE
Pt received intubated on full vent support. Pt moves left arm, leg and right leg to painful stimuli, minimal movement with right leg. Pt does not open eyes to name or painful stimuli.  Lungs coarse bilaterally, suctioning large amounts of creamy yellow sput

## 2019-10-11 PROBLEM — E46 MALNUTRITION (HCC): Status: ACTIVE | Noted: 2019-01-01

## 2019-10-11 NOTE — PROGRESS NOTES
[facility]  Progress Note    Melissa Freeman Patient Status:  Inpatient    1940 MRN [id number]   [facility] 6NE-A Attending Rosas Bertrand MD   [facility] Day # 8 PCP Kevin CURIEL       Subjective:  Patient unresponsive and intuba BILT 0.2 10/11/2019    TP 5.2 10/11/2019    AST 35 10/11/2019    ALT 18 10/11/2019    PTT 79.9 10/11/2019    MG 2.1 10/11/2019    PHOS 7.0 10/11/2019     Medications:    • Insulin Aspart Pen  1-5 Units Subcutaneous Q6H   • ipratropium-albuterol  3 mL Ne lymphadenopathy and T11 lytic lesion with extension possibly into spinal canal, C/W an extensive malignancy. Heme/onc input appreciated, patient not a candidate for biopsy or treatment options   10.  Seizure with prior cva- stable, continued on keppra, ne

## 2019-10-11 NOTE — DIETARY NOTE
[facility]    NUTRITION INITIAL ASSESSMENT    Pt meets severe malnutrition criteria.     CRITERIA FOR MALNUTRITION DIAGNOSIS:  Criteria for severe malnutrition diagnosis: chronic illness related to wt loss greater than 20% in 1 year and energy intake l admission. Pt is at high risk for refeedings syndrome, will need to monitor labs closely. 42lb wt loss over past year. Pt is currently intubated and sedated. 78year old male.  Pt admitted with MI and s/p cardiac cath with RCA stent now on antiplatele Maintain lean body mass  6.  Alternative means of nutrition at goal to meet 100% patient nutrition prescription    MEDICATIONS:  Noted    LABS:  Glucose 180 P 7.0    Pt is at High nutrition risk    FOLLOW-UP DATE: daily for TPN; full note on 10/115    Efrem MERIDA

## 2019-10-11 NOTE — CONSULTS
[facility]  Report of Consultation    Abhishek Peace Patient Status:  Inpatient    1940 MRN [id number]   [place] 6NE-A Attending Flower Streeter MD   Hosp Day # 8 PCP Harlan Ortiz     Reason for Consultation:  Keo Tillman intolerant    Medications:    Current Facility-Administered Medications:   •  Insulin Aspart Pen (NOVOLOG) 100 UNIT/ML flexpen 1-5 Units, 1-5 Units, Subcutaneous, Q6H  •  ipratropium-albuterol (DUONEB) nebulizer solution 3 mL, 3 mL, Nebulization, 4 times p [email or web address]  •  propofol (DIPRIVAN) infusion, 5-100 mcg/kg/min (Dosing Weight), Intravenous, Continuous  •  Dexmedetomidine HCl in NaCl (PRECEDEX) 400 MCG/100ML premix infusion, 0.2-1.5 mcg/kg/hr (Dosing Weight), Intravenous, Continuous  •  norepinephrin 10/11/2019    PTT 79.9 10/11/2019    MG 2.1 10/11/2019    PHOS 7.0 10/11/2019       Imaging:  Ct Brain Or Head ([id number])    Result Date: 10/3/2019  CONCLUSION:  Large old left MCA infarct, stable old ischemic changes noted in the cerebellum and right cerebru proximal anterior tibial artery is occluded . The left dorsalis pedis is not visualized and great toe pressure not obtained may also be due to occlusion, correlate clinically.     Dictated by: Dameon Jose MD on 10/09/2019 at 9:44     Approved by: Dameon Jose, by: Fronie Dakins, MD on 10/04/2019 at 15:24     Approved by: Fronie Dakins, MD            Xr Chest Ap Portable  (cpt=71045)    Result Date: 10/4/2019  CONCLUSION:    Little significant change.   Extensive mixed airspace and interstitial infiltrate in t neoplastic etiology cannot be excluded given probable metastatic disease. 5. Mild to moderate right-sided pleural effusion. 6. Emphysematous changes within the lungs.  7. 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 Intubated and sedated currently  7. PS 4    Plan  1. To fully evaluate likely lung malignancy, he would need MRI spine given concern for spinal cord involvement/compression, as well as bone or lung biopsy.   Aggressive therapy for (biopsy

## 2019-10-11 NOTE — RESPIRATORY THERAPY NOTE
Received patient on full vent support 16/500/40%/+5, no changes made overnight. Patient producing moderate-copious amounts of thick, white/yellow secretions, decreasing throughout the night. Breath sounds coarse, especially on the right.   Continue Q6 Met

## 2019-10-11 NOTE — PLAN OF CARE
Received pt at 1930. Pt on ventilator, sedated. Pt pupils reactive to light, extremities moving slightly to painful stimulus with no purposeful movement and not following commands. Pt R lungs have crackles and L lungs are diminished.  Pt has small amount of

## 2019-10-11 NOTE — CONSULTS
[facility]  Report of Inpatient Wound Care Consultation    Melissa Freeman Patient Status:  Inpatient    1940 MRN [id number]   [facility] 6NE-A Attending Rosas Bertrand MD   Hosp Day # 8 PCP [facility] Dr Bertrand Gunter temperature WNL. Obtain DP and PT via handheld doppler. Recommendations:  Right medial foot- Cleanse with saline. Apply bordered foam.Change daily and as needed. Keep off loading heel boots at all times.     Thank you for allowing me to participate in t

## 2019-10-11 NOTE — CM/SW NOTE
Care Conference held today with pt's 4 children, Dr. Amaris Bunch, Dr. Mercedes Briceño from [place], and . Family considering comfort care- [facility] to meet with family also. Pt still on vent. Martha Welch LCSW  /Disch

## 2019-10-11 NOTE — PROGRESS NOTES
[facility]    Nephrology Progress Note    Ayaan Serum Attending:  Isaiah Khan MD     Cc: TATYANA    SUBJECTIVE     Remains intubated on MV  Good UOP  Family conference held today    PHYSICAL EXAM   Vital signs: /64 (BP Location: Right arm) Intravenous Q12H   CefTRIAXone Sodium (ROCEPHIN) 1 g in dextrose 5 % 100 mL IVPB 1 g Intravenous Q24H   EPINEPHrine PF (ADRENALIN) 1 MG/10ML injection (CARDIAC ARREST)   PRN   fentaNYL citrate (SUBLIMAZE) 0.05 MG/ML injection 25 mcg 25 mcg Intravenous Q30 109 10/11/2019    BILT 0.2 10/11/2019    TP 5.2 10/11/2019    AST 35 10/11/2019    ALT 18 10/11/2019    PTT 79.9 10/11/2019    MG 2.1 10/11/2019    PHOS 7.0 10/11/2019    PGLU 239 10/11/2019       IMAGING   All imaging studies personally reviewed.     PROCE longer on IVF    Urinary retention  -- schultz in place. US unremarkable. Acidosis  -- lactate wnl. Improved w hypotonic bicarb, now off. Now on TPN, adjust acetate prn    Hypernatremia  -- will adjust tpn as needed    Lytes  -- adjust prn in TPN.  Dominick Bliss

## 2019-10-11 NOTE — CM/SW NOTE
Called and spoke with wife of pts son who stated that she would have him call me back. We called early afternoon and still have not heard back. We will follow up tomorrow with another phone call.

## 2019-10-11 NOTE — PLAN OF CARE
Assumed care of patient at 0730. Patient contracted bilater upper extremities, in splints. Pupils equal and reactive to light, opens eye to pain, does not track. Minimal stimulation to pain for bilateral ue/le.  Does not follow commands, no purpose movement activity based on assessment  Outcome: Progressing  Goal: Achieves maximal functionality and self care  Description  INTERVENTIONS  - Monitor swallowing and airway patency with patient fatigue and changes in neurological status  - Encourage and assist akira stability  Description  INTERVENTIONS:  - Monitor vital signs, rhythm, and trends  - Monitor for bleeding, hypotension and signs of decreased cardiac output  - Evaluate effectiveness of vasoactive medications to optimize hemodynamic stability  - Monitor ar

## 2019-10-11 NOTE — PROGRESS NOTES
[address] [facility] 149 Follow Up    Yolanda Eddy Patient Status:  Inpatient    1940 MRN [id number]   [facility] 6NE-A Attending Efren Schwartz MD   [facility] Day # 8 PCP Pito Walden     Date of visit:  10/11/2019  Day 8 Full   90 Full No disease  Normal Full Normal Full   80 Full Some disease  Normal w/effort Full Normal or  Reduced Full   70 Reduced Some disease  Can't perform job Full Normal or   Reduced Full   60 Reduced Significant disease  Can't perform hobby Occasio advised that family is now in agreement on DNR, but wants to know what other options are.     Option to continue aggressive care (diagnostics, responding to problems, possibility of trach + PEG, etc)  reviewed by MDs vs transition to comfort care were revie Problem List:     Primary osteoarthritis of left knee     Cerebrovascular accident 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 Family aware of PC follow up on Monday if pt remains hospitalized.       CELESTE Jolly, [facility]  Palliative Care  [phone]  10/11/2019  1:14 PM

## 2019-10-11 NOTE — PROGRESS NOTES
Mora BROWN 2. Patient Status:  Inpatient    1940 MRN [id number]   [facility] 6NE-A Attending Kd Lyles MD   [facility] Day # 8 [facility]      Critical Care Progress Note     Date of Admission: 10/3/2019 0.05 MG/ML injection 50 mcg, 50 mcg, Intravenous, Q30 Min PRN  •  acetaminophen (TYLENOL) tab 650 mg, 650 mg, Oral, Q6H PRN **OR** acetaminophen (TYLENOL) 160 MG/5ML oral liquid 650 mg, 650 mg, Oral, Q6H PRN **OR** acetaminophen (TYLENOL) 650 MG rectal sup Stool:850]      Physical Exam:                          DXXJSQV: JKFIGNYIA, unresponsive                          OCEPO:  ETT in place                          Lungs: bibasilar crackles                           Chest wall: No tenderness or deformity.       malignancy  -heme/onc following   5. STEMI: s/p stent to RCA.    - per cards. - most recent echo on 10/3 with EF: 25-30%   6. pulm fibrosis; felt related to RA. Doubt flare.   - hold on steroids for now  7.  GI bleed- small amount of bleeding noted followi

## 2019-10-12 NOTE — PROGRESS NOTES
10/12/19 0532   Clinical Encounter Type   Visited With Patient and family together  (Pt intubated.   Family at bedside welcoming prayer and empathic presence)   Routine Visit Follow-up   Continue Visiting No   Family Spiritual Encounters   Family Coping

## 2019-10-12 NOTE — PLAN OF CARE
Assumed care of patient at 0730. Patient sedated on precedex gtt on ventilator. Patient opens eye to pain, perrla, plunkett not track, no purposeful movement,  moves all extremities to pain. Upper extremities contracted, in splint.  No seizure activity identifie

## 2019-10-12 NOTE — DIETARY NOTE
Clinical Nutrition -  Parenteral Nutrition   TPN ordered for tonight and will provide 900 dextrose calories, 540 lipid calories, 30% lipids, 90 grams protein in 2400 ml fluid to meet ~100% pt nutritional  needs. Electrolytes adjusted based on am labs.  David

## 2019-10-12 NOTE — PLAN OF CARE
Received pt at 1930. Pt on ventilator, sedated. Pt pupils reactive to light, extremities moving slightly to painful stimulus with no purposeful movement and not following commands. Pt lungs diminished.  Pt has moderate amount of yellow, thick sputum with ET

## 2019-10-12 NOTE — PROGRESS NOTES
[facility] Hospitalist Progress Note     Sangeeta Mancini Patient Status:  Inpatient    1940 MRN [id number]   [facility] 6NE-A Attending Malathi Fleming MD   Hosp Day # 9 PCP Ilsa Miller     CC: follow up    SUBJECTIVE:  Alejandro rodriguez --  1.6 1.7 2.1 2.1   PHOS 2.2*  --   --  2.2* 4.2 7.0* 6.3*   *  --  115* 133* 138* 180* 155*    < > = values in this interval not displayed.        Recent Labs   Lab 10/08/19  0530 10/09/19  0416 10/10/19  0316 10/11/19  0416 10/12/19  0409   ALT keppra    # Acute PE, b/l DVTs  - hep gtt, monitor for bleeding     # GI bleed  - GI consulted  - s/p clipping and cauterization of dieulafoy lesion  - repeat EGD 10/10 with clean based 1 cm ulcer on gastric cardia on the lesser curvature  - IV PPI  - back Hospitalist  Pager: [phone]

## 2019-10-12 NOTE — RESPIRATORY THERAPY NOTE
Received patient on full vent support, PRVC 16/500/40%/+5. Tolerates Q6 Duoneb treatments via Metaneb well. Still produces a moderate amount of thick, yellow secretions. Breath sounds still coarse bilaterally. Continue to monitor closely.

## 2019-10-12 NOTE — PROGRESS NOTES
Critical Care Progress Note        NAME: Fantasma Beyer - ROOM: 9556/2466-Z - MRN: [id number] - Age: 78year old - : 1940  Date of Admission: 10/3/2019  6:31 AM  Admission Diagnosis: Pulmonary fibrosis ([facility] [J84.10]  Seizure disorder ([facility] [G40 at 10/12/19 0400   • Continuous dose Heparin infusion 1,300 Units/hr (10/12/19 0502)   • dextrose     • phenylephrine Stopped (10/07/19 1400)   • propofol     • dexmedetomidine 0.8 mcg/kg/hr (10/12/19 0400)   • norepinephrine Stopped (10/06/19 0253)     DC Recurrent Systemic Embolism, or     Mechanical Prosthetic Valve     TSH   Date/Time Value Ref Range Status   09/20/2018 06:23 AM 1.600 0.350 - 5.500 mIU/mL Final     Comment:        This test may exhibit interference when a sample is collected from a person fluid boluses if possible  9. TATYANA:  -renal following   -renal US unremarkable  10. Thrombocytopenia: likely consumptive 2/2 GI bleed  -monitor, improving  11. FEN: TPN.     12. Dispo: DNR.  ICU  - poor overall prognosis,   - palliative care following  Critic

## 2019-10-13 NOTE — PLAN OF CARE
Received pt at 1930. Pt on ventilator, sedated. Pt pupils reactive to light, extremities moving slightly to painful stimulus with no purposeful movement and not following commands. Pt lungs clear/diminished.  Pt has small amount of clear/white, thick sputum

## 2019-10-13 NOTE — PROGRESS NOTES
[facility]    Nephrology Progress Note    Ifrah Tania Attending:  Mianl Cruz MD       SUBJECTIVE:     Intubated sedated  K elevated  Making urine  Had some stool output in response to kayexelate    PHYSICAL EXAM:     Vital Signs: BP (!) 10/06/2019    EOSABS 0.00 10/06/2019    BASABS 0.00 10/06/2019    NEUT 82 10/06/2019    LYMPH 5 10/06/2019    MON 5 10/06/2019    EOS 0 10/06/2019    BASO 0 10/06/2019    NEPERCENT 91.2 10/11/2019    LYPERCENT 4.4 10/11/2019    MOPERCENT 2.8 10/11/2019 (KEPPRA) 500 mg in dextrose 5 % 100 mL IVPB, 500 mg, Intravenous, Q12H  CefTRIAXone Sodium (ROCEPHIN) 1 g in dextrose 5 % 100 mL IVPB, 1 g, Intravenous, Q24H  EPINEPHrine PF (ADRENALIN) 1 MG/10ML injection (CARDIAC ARREST), , , PRN  fentaNYL citrate (SUBLI fibrosis, and DVT/PE started on anticoagulation but then developed GIB sp EGD w clipping and cauterizing. ALso w shock on pressors.  Now w TATYANA.      TATYANA: pre-renal/ATN in the setting of shock  -- continue TPN  -- strict I/Os  -- maintain MAP>65  -- continue

## 2019-10-13 NOTE — PROGRESS NOTES
[facility] Hospitalist Progress Note     Shell Rios Patient Status:  Inpatient    1940 MRN [id number]   [facility] 6NE-A Attending Belem Gleason MD   Hosp Day # 10 PCP González Shaw     CC: follow up    SUBJECTIVE:  HgB drop b Lab 10/09/19  0416 10/10/19  0316 10/11/19  0416 10/12/19  0409 10/13/19  0440   ALT 24 20 18  --   --    AST 48* 36 35  --   --    ALB 1.1* 1.1* 1.0* 1.1* 1.0*       Recent Labs   Lab 10/12/19  1216 10/12/19  1243 10/12/19  1806 10/12/19  0192 10/13/19 ulcer on gastric cardia on the lesser curvature  - IV PPI  - back on hep gtt  - trend Hgb  - transfuse today 1U pRBC     # STEMI   - s/p BIPIN x 2 to RCA  - cardiology following     # Shock - improved  - 2/2 GI losses, infection, STEMI  - off pressors    # L

## 2019-10-13 NOTE — DIETARY NOTE
Clinical Nutrition -  Parenteral Nutrition   TPN ordered for tonight and will provide 900 dextrose calories, 540 lipid calories, 30% lipids, 90 grams protein in 2400 ml fluid to meet ~100% pt nutritional  needs. Electrolytes adjusted based on am labs.  Cont

## 2019-10-13 NOTE — PROGRESS NOTES
Critical Care Progress Note        NAME: Fantasma Beyer - ROOM: 2674/6757-H - MRN: [id number] - Age: 78year old - : 1940  Date of Admission: 10/3/2019  6:31 AM  Admission Diagnosis: Pulmonary fibrosis ([facility]) [J84.10]  Seizure disorder ([facility]) [G40 • Continuous dose Heparin infusion 1,300 Units/hr (10/13/19 0400)   • dextrose     • phenylephrine Stopped (10/07/19 1400)   • propofol     • dexmedetomidine 0.8 mcg/kg/hr (10/13/19 0400)   • norepinephrine Stopped (10/06/19 0253)     PRN Medication:dext Recurrent Systemic Embolism, or     Mechanical Prosthetic Valve     TSH   Date/Time Value Ref Range Status   09/20/2018 06:23 AM 1.600 0.350 - 5.500 mIU/mL Final     Comment:        This test may exhibit interference when a sample is collected from a person unit today  -monitor closely as pt is on heparin gtt for VTE  9. Shock- secondary to GI loss, sedation, STEMI  - off pressors.   - avoid fluid boluses if possible  10. TATYANA:  -renal following   -renal US unremarkable  11.  Thrombocytopenia: likely consumptiv

## 2019-10-13 NOTE — RESPIRATORY THERAPY NOTE
Received patient on full vent support PRVC 16/500/40%/+5, tolerating well. Duoneb given Q6 via metaneb. Coarse/diminished breath sounds. Suctioning up small amount of secretions. No changes made throughout the night. Will continue to monitor.

## 2019-10-13 NOTE — PROGRESS NOTES
10/13/19 1747   Clinical Encounter Type   Visited With Patient and family together   Continue Visiting No  ( remains available at pager #[phone] as needed/requested.)   Patient's Supportive Strategies/Resources Patient has supportive family.    Reli

## 2019-10-13 NOTE — PLAN OF CARE
Assumed care of patient at 0730. Patient upper/lower extremities move to pain, no purposeful movement. Pupils equal and reactive, does not track. UE contracted, splints in place. No seizure activity noted.  Pt in NSR, hr in 04E, systolic bp 89-179L. 1 unit

## 2019-10-13 NOTE — PROGRESS NOTES
[facility]    Nephrology Progress Note    Nathan Flynn Attending:  Clare Resendiz MD       SUBJECTIVE:     Non-oliguric  BPs are steady  Remains intubated  Hb dropped but no signs of bleeding per nursing    PHYSICAL EXAM:     Vital Signs: BP 10/06/2019    BASABS 0.00 10/06/2019    NEUT 82 10/06/2019    LYMPH 5 10/06/2019    MON 5 10/06/2019    EOS 0 10/06/2019    BASO 0 10/06/2019    NEPERCENT 91.2 10/11/2019    LYPERCENT 4.4 10/11/2019    MOPERCENT 2.8 10/11/2019    EOPERCENT 0.6 10/11/2019 % 100 mL IVPB, 500 mg, Intravenous, Q12H  CefTRIAXone Sodium (ROCEPHIN) 1 g in dextrose 5 % 100 mL IVPB, 1 g, Intravenous, Q24H  EPINEPHrine PF (ADRENALIN) 1 MG/10ML injection (CARDIAC ARREST), , , PRN  fentaNYL citrate (SUBLIMAZE) 0.05 MG/ML injection 25 anticoagulation but then developed GIB sp EGD w clipping and cauterizing. ALso w shock on pressors.  Now w TATYANA.      TATYANA: pre-renal/ATN in the setting of shock  -- continue TPN  -- strict I/Os  -- maintain MAP>65  -- continue schultz     Urinary retention  --

## 2019-10-14 NOTE — DIETARY NOTE
Clinical Nutrition -  Parenteral Nutrition   TPN ordered for tonight and will provide 900 dextrose calories, 540 lipid calories, 30% lipids, 90 grams protein in 2400 ml fluid to meet ~100% pt nutritional  needs. Electrolytes adjusted based on am labs.  CALLIE lomax

## 2019-10-14 NOTE — PROGRESS NOTES
[facility]    Nephrology Progress Note    Ayaan Serum Attending:  Amairani Ramirez MD       SUBJECTIVE:     UOP increasing  Creatinine slightly better  Still intubated/heparin gtt/TPN noted    PHYSICAL EXAM:     Vital Signs: /54   Pulse BASABS 0.00 10/06/2019    NEUT 82 10/06/2019    LYMPH 5 10/06/2019    MON 5 10/06/2019    EOS 0 10/06/2019    BASO 0 10/06/2019    NEPERCENT 91.2 10/11/2019    LYPERCENT 4.4 10/11/2019    MOPERCENT 2.8 10/11/2019    EOPERCENT 0.6 10/11/2019    BAPERCENT 0. Intravenous, Continuous  levETIRAcetam (KEPPRA) 500 mg in dextrose 5 % 100 mL IVPB, 500 mg, Intravenous, Q12H  EPINEPHrine PF (ADRENALIN) 1 MG/10ML injection (CARDIAC ARREST), , , PRN  fentaNYL citrate (SUBLIMAZE) 0.05 MG/ML injection 25 mcg, 25 mcg, Intra then developed GIB sp EGD w clipping and cauterizing. ALso w shock on pressors.  Now w TATYANA.      TATYANA: pre-renal/ATN in the setting of shock  -- continue TPN  -- strict I/Os  -- maintain MAP>65  -- continue schultz     Urinary retention  -- continue schultz

## 2019-10-14 NOTE — PLAN OF CARE
Received pt at 1930. Pt on ventilator, sedated. Pt pupils reactive to light, extremities moving slightly to painful stimulus with no purposeful movement and not following commands. Pt lungs rhonchi/diminished.  Pt has moderate amount of clear/tan, thick spu

## 2019-10-14 NOTE — PROGRESS NOTES
Mora BROWN 2. Patient Status:  Inpatient    1940 MRN [id number]   [facility] 6NE-A Attending Zenon Yip MD   [facility] Day # 6 [facility]      Critical Care Progress Note     Date of Admission: 10/3/2 injection 50 mcg, 50 mcg, Intravenous, Q30 Min PRN  •  acetaminophen (TYLENOL) tab 650 mg, 650 mg, Oral, Q6H PRN **OR** acetaminophen (TYLENOL) 160 MG/5ML oral liquid 650 mg, 650 mg, Oral, Q6H PRN **OR** acetaminophen (TYLENOL) 650 MG rectal suppository 65 sedated                          HEENT: eyes deviated to the right                           Lungs: Clear to auscultation bilaterally, no wheezes or crackles                           Chest wall: No tenderness or deformity.                           Heart:  for underlying malignancy  -heme/onc following   5. STEMI: s/p stent to RCA.    - per cards. - most recent echo on 10/3 with EF: 25-30%   6. pulm fibrosis; felt related to RA. Doubt flare.   - hold on steroids for now  7.  GI bleed- small amount of bleedin

## 2019-10-14 NOTE — PROGRESS NOTES
[facility] Hospitalist Progress Note     Fantasma Beyer Patient Status:  Inpatient    1940 MRN [id number]   [facility] 6NE-A Attending Ry Garcia MD   Hosp Day # 6 PCP Anupam Robertson     CC: follow up    SUBJECTIVE:  No events 24 20 18  --   --   --    AST 48* 36 35  --   --   --    ALB 1.1* 1.1* 1.0* 1.1* 1.0* 1.0*       Recent Labs   Lab 10/13/19  0603 10/13/19  1123 10/13/19  1713 10/14/19  0011 10/14/19  0539   PGLU 188* 180* 131* 134* 167*       Imaging:        Meds:   Sche s/p BIPIN x 2 to RCA  - cardiology following     # Shock - improved  - 2/2 GI losses, infection, STEMI  - off pressors     # L anterior tibial artery occlusion  - Dr. Knight discussed with vascular surgery, Dr. Adama Yoon 10/9 - suspects this may be chroni

## 2019-10-14 NOTE — HOSPICE RN NOTE
Met with son Jarad Vargas and his wife Renata Del Rosario to review hospice philosophy and benefit. [facility] discussed and questions answered.   Their son is very close with his grandfather and is anticipated to come to visit later today; they want to move forward with hospice

## 2019-10-14 NOTE — RESPIRATORY THERAPY NOTE
Received patient on full vent support PRVC 16/500/40%/+5, tolerating well. Duoneb given Q6 via metaneb. Coarse/diminished breath sounds. Suctioning up moderate amount of thick, tan/yellow secretions. In-line suction catheter changed out.  No changes made th

## 2019-10-14 NOTE — PLAN OF CARE
Assumed patient care at 0730. Upon shift change, Precedex, Heparin, and TPN infusing. Precedex titrated for increased respiratory distress. Patient was turned every 2 hours. Hospice met with family, will meet again at 0930 tomorrow.

## 2019-10-14 NOTE — PROGRESS NOTES
[address] [facility] 149 Follow Up    Candelario Turner  [id number]  Hospital Day #11  Date of Consult: 10/08/19  Patient seen at: [facility] Room 2571    Subjective:      Patient was seen and examined with no one else at the bedside.    Pablo Rocha 0.05 MG/ML injection 50 mcg, 50 mcg, Intravenous, Q30 Min PRN  •  acetaminophen (TYLENOL) tab 650 mg, 650 mg, Oral, Q6H PRN **OR** acetaminophen (TYLENOL) 160 MG/5ML oral liquid 650 mg, 650 mg, Oral, Q6H PRN **OR** acetaminophen (TYLENOL) 650 MG rectal sup ALB 1.0 (L) 10/14/2019    ALKPHO 109 10/11/2019    BILT 0.2 10/11/2019    TP 5.2 (L) 10/11/2019    AST 35 10/11/2019    ALT 18 10/11/2019    PSA 1.410 07/01/2017    DDIMER 11.20 (H) 09/06/2018    MG 2.0 10/14/2019    PHOS 3.4 10/14/2019    TROP 1.660 (H Assist  Total Care Mouth care Drowsy or coma   0 Death     Palliative Care Assessment     Goals of Care: mixed reports on documentation however per Alfonso Barraza hospice services will meet with family tomorrow around 9 AM - confirmed with his ICU RN support provided. 5. Disposition: TBD - family meeting with hospice services tomorrow at around 9 AM    6. Discussed today’s visit with his ICU RN Estrella.     A total of 15 minutes were spent on this consult; which included all of the following: direct

## 2019-10-15 PROBLEM — I63.9 CVA (CEREBRAL VASCULAR ACCIDENT) (HCC): Status: ACTIVE | Noted: 2019-01-01

## 2019-10-15 NOTE — PROGRESS NOTES
Mora BROWN 2. Patient Status:  Inpatient    1940 MRN [id number]   [facility] 6NE-A Attending Clare Reesndiz MD   [facility] Day # 15 PCP Frandy Barragan     Critical Care Progress Note     Date of Admission: 10/3/2 650 mg, 650 mg, Oral, Q6H PRN **OR** acetaminophen (TYLENOL) 160 MG/5ML oral liquid 650 mg, 650 mg, Oral, Q6H PRN **OR** acetaminophen (TYLENOL) 650 MG rectal suppository 650 mg, 650 mg, Rectal, Q6H PRN  •  PEG 3350 (MIRALAX) powder packet 17 g, 17 g, Oral intubated, contracted upper extremities                          HEENT: ETT in place                          Lungs: Clear to auscultation bilaterally, no wheezes or crackles                           Chest wall: No tenderness or deformity.                  per cards. - most recent echo on 10/3 with EF: 25-30%   6. pulm fibrosis; felt related to RA. Doubt flare.   - hold on steroids for now  7.  GI bleed- small amount of bleeding noted following re-initiation of heparin gtt, persists but has not increased in

## 2019-10-15 NOTE — DISCHARGE SUMMARY
General Medicine Discharge Summary     Patient ID:  Robbie Miranda  78year old  1/25/1940    Admit date: 10/3/2019    Discharge date and time: 10/15/2019 12:41 PM     Attending Physician: No att. 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 No acute disease.    Dictated by: Renetta Singh MD on 10/03/2019 at 7:20     Approved by: Andriy Rae pressure not obtained may also be due to occlusion, correlate clinically.     Dictated by: Yesika Ritter MD on 10/09/2019 at 9:44     Approved by: Yesika Ritter MD             Arterial Duplex Lower Extremity Right (cpt=93926)    Result Date: 10/8/2019  CONCLUSI Ap Portable  (cpt=71045)    Result Date: 10/4/2019  CONCLUSION:    Little significant change. Extensive mixed airspace and interstitial infiltrate in the right lung especially mid-lower. Much milder primarily interstitial infiltrate left lower lung.   Sta pleural effusion. 6. Emphysematous changes within the lungs. 7. 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 There is incomplete opacification of the vessel on current examination.  8.  This critical value resul

## 2019-10-15 NOTE — PROGRESS NOTES
10/15/19 0816   Clinical Encounter Type   Referral To Nurse  ( contacting  for Niko Barger 34 (anointing) as requested. )

## 2019-10-15 NOTE — PROGRESS NOTES
10/15/19 6571   Clinical Encounter Type   Visited With Family   Patient's Supportive Strategies/Resources  provided support with patient's family. Large family at the bedside.  to remain available at pager [phone].    Referral To Nurse  (F

## 2019-10-15 NOTE — PROGRESS NOTES
Medical chart reviewed  Discussed with Va Gray from hospice services  Family decided to utilize [facility] hospice services for pure comfort care, maximizing quality of life  She is placing orders in Epic    Will sign off    Thank you for consulting our

## 2019-10-15 NOTE — PROGRESS NOTES
[facility]    Nephrology Progress Note    Sangeeta Mancini Attending:  Malathi Fleming MD       SUBJECTIVE:     Still on TPN    PHYSICAL EXAM:     Vital Signs: /58   Pulse 84   Temp 97.3 °F (36.3 °C) (Temporal)   Resp 23   Ht 6' 1\" (1.854 m 10/06/2019    BASABS 0.00 10/06/2019    NEUT 82 10/06/2019    LYMPH 5 10/06/2019    MON 5 10/06/2019    EOS 0 10/06/2019    BASO 0 10/06/2019    NEPERCENT 91.2 10/11/2019    LYPERCENT 4.4 10/11/2019    MOPERCENT 2.8 10/11/2019    EOPERCENT 0.6 10/11/2019 Intravenous, Continuous  levETIRAcetam (KEPPRA) 500 mg in dextrose 5 % 100 mL IVPB, 500 mg, Intravenous, Q12H  EPINEPHrine PF (ADRENALIN) 1 MG/10ML injection (CARDIAC ARREST), , , PRN  fentaNYL citrate (SUBLIMAZE) 0.05 MG/ML injection 25 mcg, 25 mcg, Intra then developed GIB sp EGD w clipping and cauterizing. ALso w shock on pressors.  Now w TATYANA.      TATYANA: pre-renal/ATN in the setting of shock  -- continue TPN  -- strict I/Os  -- maintain MAP>65  -- continue schultz     Urinary retention  -- continue schultz

## 2019-10-15 NOTE — PLAN OF CARE
Pt received intubated on full vent support, FIGUEROA minimally to painful stimuli, opens eyes to painful stimuli, no following any commands. Does not track with eyes or make eye contact. Lungs coarse, diminished bilaterally.  Suctioning thick creamy yellow sputu

## 2019-10-15 NOTE — H&P
[facility] Hospitalist H&P       CC: No chief complaint on file. PCP: Mazin Sutton    History of Present Illness:  69y/o M MMP admitted with multiple issues including STEMI, PE, b/l DVT, GIB, encephalopathy, ARHF vent dependent.  Also with imaging findi + scattered bruising over extremities.  Toes on b/l feet cyanotic  Neuro: unresponsive          Diagnostic Data:    CBC/Chem  Recent Labs   Lab 10/10/19  0316  10/11/19  0416 10/12/19  0409 10/13/19  0440 10/13/19  1302 10/14/19  0402 10/15/19  0424   WBC 1 ventricles are unchanged in appearance. There is ex vacuo enlargement of the left lateral ventricle. INTRACRANIAL:  Again noted is a large old left MCA infarct. There is no acute hemorrhage noted.   Old lacunar infarcts are noted within the cerebellum and Diag Img (tyi=36907)    Result Date: 10/3/2019  PROCEDURE:  US VENOUS DOPPLER LEG BILAT - DIAG IMG (CPT=93970)  COMPARISON:  None.   INDICATIONS:  r/o dvt  TECHNIQUE:  Real time, grey scale, and duplex ultrasound was used to evaluate the lower extremity ok problems including shock, GI bleed (but currently no sign of GI bleed), NSTEMI, TATYANA, respiratory sonya  TECHNIQUE:  Unenhanced multislice CT scanning was performed from the dome of the diaphragm to the pubic symphysis. Dose reduction techniques were used.  D present at this level (series 2, image 17). CONCLUSION:  No evidence of retroperitoneal hematoma. Slight interval progression of airspace disease noted within the visualized lower lobes bilaterally. This most likely represents pneumonia.   Extensive PEAK VELOCITIES (CM/S): LEFT COMMON FEMORAL:      108  LEFT PROFUNDA FEMORAL:      77    LEFT SUPERFICIAL FEMORAL PROX:    69 LEFT SUPERFICIAL FEMORAL MID:      59 LEFT SUPERFICIAL FEMORAL DISTAL:    85 LEFT POPLITEAL PROX:      106 LEFT POPLITEAL DISTAL: FEMORAL:      55 RIGHT SUPERFICIAL FEMORAL PROX:    74 RIGHT SUPERFICIAL FEMORAL MID:    60 RIGHT SUPERFICIAL FEMORAL DISTAL:    80 RIGHT POPLITEAL PROX:      85 RIGHT POPLITEAL DISTAL:      66 RIGHT PTA PROX:      79 RIGHT PTA MID:      79 RIGHT PTA DISTA FINDINGS:  Endotracheal tube tip lies in good position in the mid trachea. Left-sided PICC tip is in the SVC in good position. Heart size is within the limits of normal.  There is a tortuous, ectatic thoracic aorta.   There has been no significant interva FINDINGS:  Endotracheal tube with tip approximately 4 cm above the level of the trinh. Stable cardiac size. Interstitial opacities bilaterally which are stable. Small right-sided pleural effusion. Right basilar atelectasis/infiltrates.   No pneumothor (CPT=71045), 10/03/2018, 10:53. INDICATIONS:  seizure  PATIENT STATED HISTORY: (As transcribed by Technologist)  Patient offered no additional history at this time.          CONCLUSION:  1. Stable mild cardiomegaly with moderate ectasia of the thoracic aor limited secondary to incomplete opacification but this appears stable from prior CT. LUNGS:  Emphysematous changes within the lungs. Consolidation noted within the right upper and right lower lobes. No pneumothorax.   Respiratory motion artifact limits ev lungs. 7. 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 There is incomplete opacification of the vessel on current examination.  8.  This critical value result was called to patient's nurse Goyo Garces on 10/3/2019 at

## 2019-10-15 NOTE — RESPIRATORY THERAPY NOTE
Patient received on PRVC 16/500/40+5. No distress at this time. Continuing Metaneb Q6 as ordered with Duoneb. Patient suctioned and produced a moderate amount of thick tan secretions via ETT. Will continue to monitor closely.

## 2019-10-15 NOTE — DIETARY NOTE
Clinical Nutrition:    Hospice orders noted. Nutrition services will sign off at this time. Re-consult RD if further nutrition care is needed.     Purvi Hernandez RD, LDN  Pager [phone]

## 2019-10-16 NOTE — PROGRESS NOTES
[facility] Hospitalist Progress Note                                                                   Mora BROWN 2.  1/25/1940    CC: FU comfort care    Interval History:  - Comfortable, agon

## 2019-10-16 NOTE — PLAN OF CARE
Pt arrived to med/onc with family at the bedside. Pt was repositioned and seems comfortable, is on a continuous morphine gtt. Will continue to monitor.

## 2019-10-16 NOTE — RESPIRATORY THERAPY NOTE
Received Pt on full support. Transitioned to hospice following care conference. Ventilator discontinued and Pt extubated around 1530 this afternoon. Pt no longer receiving services from respiratory therapy at this time.

## 2019-10-16 NOTE — HOSPICE RN NOTE
Day 2 GIP status. Pt was de-vented on 10/15/19. Pt on O2 2L/NC, resp even, non-labored, raspy noise with expirations. Pt has mild congestion noted, Scopolamine patch in place. Pt on Morphine drip 4m mg/hr.  Pt has had PRN Ativan 0.5 mg x 3 doses, PRN Atropin

## 2019-10-16 NOTE — PLAN OF CARE
Pt unresponsive. Monitored for s/s of pain. Ativan given this am per son request. Pt appears comfortable. Son anxious and requests vitals and pulse ox to be checked. Son wants oxygen on. Son states \"I didn't think it would take this long. \" emotional supp pain  - Anticipate increased pain with activity and pre-medicate as appropriate  Outcome: Progressing

## 2019-10-16 NOTE — PROGRESS NOTES
Family at bedside. Pt is mostly unresponsive, opens eyes but not to commands. Periods of apnea. Family requesting oxygen for comfort. Given ativan when patient appeared to be fluttering eyes and facial movements.   Atropine drops given for slight conges

## 2019-10-17 NOTE — PLAN OF CARE
Assumed care at 2300. Suctioned with scanty whitish secretions. Unresponsive. With Cheyne galloway breathing. Made comfortable with Morphine drip infusing. Given also 0.5 mg IV of Ativan for comfort. Family at bedside.   0130 No vital signs appreciated,no respirati

## 2019-10-17 NOTE — PROGRESS NOTES
10/17/19 0216   Clinical Encounter Type   Visited With Family   Continue Visiting No   Crisis Visit Death   Referral From Family   Referral To    Orthodox Encounters   Orthodox Needs Prayer   Family Spiritual Encounters   Family Coping Accepti

## 2019-10-24 NOTE — DISCHARGE SUMMARY
General Medicine Discharge Summary     Patient ID:  Wil Bran  78year old  1/25/1940    Admit date: 10/15/2019    Discharge date and time:  10/17/19    Attending Physician: Sandra att. providers fo

## 2019-12-27 NOTE — PALLIATIVE CARE NOTE
[address] Follow Up      Aliya Mengwa  [id number]       Patient seen and evaluated, family (daughter) at bedside. Pressors are now off.     Physical Exam:  GEN:  NAD  Neuro:  Fatigued, does open eyes to verbal stimuli  R
[address] Follow Up      [facility]  [id number]       Patient seen and evaluated, family (son) at bedside. Events of yesterday noted.     ROS: unable to obtain; patient is non-verbal with this writer    Physical Exam:
[address] Follow Up      Melissa Freeman  [id number]       Patient seen and evaluated, family (daughter) at bedside.  Patient transfer back to ICU today due to possible aspiration PNA versus HCAP.     ROS: unable to provide
[address] Follow Up      Camilla Leong  [id number]       Patient seen and evaluated, family (daughter) at bedside. Transferred to ICU yesterday due to hypotension.     Physical Exam:  GEN:  NAD  Neuro:  Sleeping, non-verb
140

## 2024-06-04 NOTE — ED NOTES
Addended by: LILA BROWER on: 6/4/2024 02:49 PM     Modules accepted: Orders     Patient taken directly from CT to cath lab, RN report given to cath lab RN.

## (undated) DEVICE — CLIP LGT 11MM OPEN 2.8MM 235CM

## (undated) DEVICE — REM POLYHESIVE ADULT PATIENT RETURN ELECTRODE: Brand: VALLEYLAB

## (undated) DEVICE — ENDOSCOPY PACK - LOWER: Brand: MEDLINE INDUSTRIES, INC.

## (undated) DEVICE — 3M™ RED DOT™ MONITORING ELECTRODE WITH FOAM TAPE AND STICKY GEL, 50/BAG, 20/CASE, 72/PLT 2570: Brand: RED DOT™

## (undated) DEVICE — ENDOSCOPY PACK UPPER: Brand: MEDLINE INDUSTRIES, INC.

## (undated) DEVICE — CATH GOLD PROBE HEMOGLIDE 7FR

## (undated) DEVICE — CLIP RESOLUTION 235CM

## (undated) DEVICE — 1200CC GUARDIAN II: Brand: GUARDIAN

## (undated) DEVICE — GLOVE SURG SENSICARE SZ 7

## (undated) DEVICE — Device: Brand: DEFENDO AIR/WATER/SUCTION AND BIOPSY VALVE

## (undated) DEVICE — FILTERLINE NASAL ADULT O2/CO2

## (undated) DEVICE — NEEDLE CONTRAST INTERJECT 25G

## (undated) NOTE — LETTER
Stefany Bean 182 6 13[facility] E  [address] [place] [address], [address] [place] [address]    Consent for Operation  Date: October 5, 2019                                Time: _______________    1.  I authorize the performance upon Melissa Pete the following operation:  Procedure procedure has been videotaped, the surgeon will obtain the original videotape. The hospital will not be responsible for storage or maintenance of this tape.     6. For the purpose of advancing medical education, I consent to the admittance of observers to t STATEMENTS REQUIRING INSERTION OR COMPLETION WERE FILLED IN.     Signature of Patient:   ___________________________    When the patient is a minor or mentally incompetent to give consent:  Signature of person authorized to consent for patient: ____________

## (undated) NOTE — IP AVS SNAPSHOT
Patient Demographics     Address  [place] [address]  [place] [address] Phone  [phone] (Home) *Preferred*  [phone] [facility]) E-mail Address  [email or web address]      Emergency Contact(s)     Name Relation Home Work [address] [place] [address]  [phone]             Schedule an appointment as soon as possible for a visit with Dio Laureano MD.    Specialty:  ENDOCRINOLOGY  Why:  As needed  Contact information:  [place]  [address] [facility]  Juliano Munroe Next dose due:  10/11/18@ 08:00      Take 2.5 mg by mouth daily. Sertraline HCl 25 MG Tabs  Commonly known as:  ZOLOFT  Next dose due:  10/11/18 @ 09:00      Take 1 tablet (25 mg total) by mouth daily.    MARIEL Mccall         TiZANidine HCl 2 [id number] levETIRAcetam (KEPPRA) tab 500 mg 10/10/18 1036 Given      [id number] omeprazole (PRILOSEC) 2mg/ml suspension 20 mg 10/10/18 1445 Given      [id number] predniSONE (DELTASONE) tab 2.5 mg 10/10/18 1037 Given            LEFT UPPER ABDOMEN     Order I Blood Culture Once [[id number]] Collected:  10/04/18 1328    Order Status:  Completed Lab Status:  Final result Updated:  10/09/18 1400    Specimen:  Blood,peripheral      Blood Culture Result No Growth 5 Days    Blood Culture Once [[id number]] Collected: MRSA Culture Only Once [[id number]] Collected:  09/25/18 1908    Order Status:  Completed Lab Status:  Final result Updated:  09/27/18 1402    Specimen:  Other from Nares      Mrsa Culture No MRSA Isolated    Blood Culture Downtime [[id number]] Collected: Related Notes:  Original Note by Ezella Bamberger, [place] (Physician Assistant) filed at 2018 11:47 AM       [facility] Patient Status:  Observation    1940 MRN [id number]   [facility] 5N aspirin 325 MG Oral Tab Take 1 tablet (325 mg total) by mouth daily. Disp: 20 tablet Rfl: 0 9/18/2018 at Unknown time   atorvastatin 10 MG Oral Tab Take 1 tablet (10 mg total) by mouth nightly.  Disp: 30 tablet Rfl: 0 9/18/2018 at 2000   methotrexate 10 MG Heart:[KK.1]  Regular rate, no wheeze appreciated[KK. 2]    Abdomen:[KK.1] soft, no grimace with deep palpation[KK. 2]  Extremities:[KK.1] no[KK. 2] pedal edema, intact pulses  Neurologic: no focal neurological deficits  Musculoskeletal:[KK. 1] RUE and RLE con - IVF[KK.2]  -[KK. 1] concern for pneumonia, pulmonary consulted[KK. 3]    # cerebrovascular disease, L MCA  CVA 5/2018  - with resultant right sided weakness, aphasia, dysphagia  -[KK. 1] SLP, PT evals  - ASA, statin[KK. 3]    # pulmonary fibrosis  -[KK. 1] pr - with resultant right sided weakness, aphasia, dysphagia  -[KK. 1] SLP, PT evals  - ASA, statin[KK. 3]    Agree with note above    Wendy Rubio DO  [facility] Hospitalist  [phone][DO.1]        Electronically signed by Vernal Paget, DO on 9/19/2018  1: adjusted  From every 8 hours to every 6 hours. Patient has been eating a modified diet. Family states he has a cough. Appears to have pain at times, cannot always tell family about his pain. [KK.2]       History:      Past Medical History:   Diagnosis Date the lungs every 4 to 6 hours as needed for Wheezing. Disp: 1 Inhaler Rfl: 0 Unknown at Unknown time   Sertraline HCl 50 MG Oral Tab Take 50 mg by mouth daily. Disp:  Rfl:  Unknown at Unknown time   prednisoLONE 5 MG Oral Tab Take 2.5 mg by mouth daily.  Dis CXR 9/19/18:  · CONCLUSION:  No acute finding. Continued clinical correlation recommended. Preliminary report offered by Real Time Tomography Radiology.      Dictated by: Harrison Antoine MD on 9/19/2018 at 8:02     Approved by: Harrison Antoine MD            CT abdomen / pelvis Attribution Bustillos    KK. 1 - Tate Gama on 9/19/2018  9:03 AM  KK. 2 - Tate Gama on 9/19/2018 11:32 AM  KK. 3 - Tate Gama on 9/19/2018 10:45 AM  KK. 4 - Tate Gama on 9/19/2018 10:48 AM                        Consults - MD 2.  Development of bilateral pleural effusions small on the left, small-moderate on the right. 3.  Extensive vasculopathy of the aorta through the bifurcation.   2 of numerous plaques are noted especially in the transverse and proximal descending thoracic 40 mEq Intravenous Once   Followed by      [COMPLETED] potassium chloride IVPB premix 20 mEq 20 mEq Intravenous Once   levETIRAcetam (KEPPRA) tab 500 mg 500 mg Oral BID   Meropenem (MERREM) 500 mg in sodium chloride 0.9% 100 mL  mg Intravenous Q8H [] LORazepam (ATIVAN) 2 MG/ML injection      [COMPLETED] LORazepam (ATIVAN) injection 1 mg 1 mg Intravenous Once PRN   [] potassium chloride (K-SOL) 40 meq/30 ml (10%) oral solution 40 mEq 40 mEq Oral Q4H   ibuprofen (MOTRIN) tab 400 mg 400 m folic acid (FOLVITE) tab 1 mg 1 mg Oral Daily   And      ascorbic acid (VITAMIN C) tab 250 mg 250 mg Oral Daily   [COMPLETED] sodium chloride 0.9% IV bolus 500 mL 500 mL Intravenous Once   [COMPLETED] lactated ringers IV bolus 500 mL 500 mL Intravenous Onc Intravenous ONCE PRN   [COMPLETED] iohexol (OMNIPAQUE) 350 MG/ML injection 100 mL 100 mL Intravenous ONCE PRN   [COMPLETED] 0.9%  NaCl infusion  Intravenous Once   [COMPLETED] potassium chloride 40 mEq in sodium chloride 0.9 % 250 mL IVPB 40 mEq Intravenou Eyes:  Conjunctivae/lids clear. PERRL, EOMs intact. Vision functional.   Ears/Nose/Throat: Hearing intact. Lips, mucosa, and tongue normal. Teeth and gums normal. Moist mucous membranes. Neck: No neck masses or thyroid enlargement/tenderness/nodules. However, if pt becomes more alert and participatory, will reassess for Acute inp rehab with [facility] 2-3 weeks and goals for dc home with24 hr Care at [address] [place] [address]. D/W pt's daughter.                                           Advance directives reviewed and not hypotension and decreased responsiveness with possible seizure     Therapy significant co-morbidities:  Recent left MCA CVA 5/2018, dysphagia, pulmonary fibrosis, anemia, RA   [AR.1]  Called DUARTE Hammonds[AR.2] 10/9/2018[AR.4]:   spoke to[AR.2] Jordyn Ozuna blankets)?: Unable   -   Sitting down on and standing up from a chair with arms (e.g., wheelchair, bedside commode, etc.): Unable   -   Moving from lying on back to sitting on the side of the bed?: Unable[AR.3]   How much help from another person does the Results of the AM-PAC \"6 clicks\" Inpatient Daily Mobility Short Form for the patient is 100% degree of basic mobility impairment.   At this time, Pt. presents with decreased balance, impaired strength, difficulty with gait/transfers resulting in downgrade Occupational Therapy Note signed by Desirae Ribera OT at 10/9/2018  1:05 PM  Version 1 of 1    Author:  Desirae Ribera OT Service:  Roberto Mcqueen Author Type:  Occupational Therapist    Filed:  10/9/2018  1:05 PM Date of Service:  10/9/2018 12:49 PM Status: Location: grimaced when attempted to stand  Management Techniques: Repositioning     ACTIVITY TOLERANCE  O2 Saturation: 98%    ACTIVITIES OF DAILY LIVING ASSESSMENT  AM-PAC ‘6-Clicks’ Inpatient Daily Activity Short Form  How much help from another person d Pt continues to work toward OT goal.   Current limitations: R hand contracture, limited R UE ROM, increased tone R arm, increased tone R LE and trunk, decreased standing balance, and decreased sitting balance. Agree with previous OT's comment about[MS. 1] Author:  NICOLE Bolanos OTR/L Service:  — Author Type:  Occupational Therapist    Filed:  10/8/2018  4:39 PM Date of Service:  10/8/2018  4:20 PM Status:  Signed    :  NICOLE Bolanos OTR/L (Occupational Therapist)       Micheal Rodríguez Weight Bearing Restriction: None[ELIZABET. 2]                PAIN ASSESSMENT[ELIZABET.1]  Rating: Unable to rate  Location: buttocks  Management Techniques: Repositioning[ELIZABET. 2]     ACTIVITY TOLERANCE  Room air  No shortness of breath    ACTIVITIES OF DAILY LIVING ASSES feedback when left hand was squeezed. Observed video taken by daughter of pt feeding self. Pt demonstrated abilities to hold fork in hand and attempt to shovel food on to fork. Pt demonstrated difficulties bringing food to mouth.   When food fell on to hi Patient will javier pullover shirt with mod A[ELIZABET.1]         Attribution [employer]    ELIZABET.1 - Robert Keita, NICOLE, OTR/L on 10/8/2018  4:20 PM  ELIZABET.2 - NICOLE Amaro, OTR/L on 10/8/2018  4:37 PM                        Video Swallow Study Note - Video Sw \"[JM. 1]p[JM.3]atient presents with moderate oral and suspect pharyngeal dysphagia secondary to reduced bolus manipulation/significant anterior loss of bolus secondary to residual right-sided weakness; and suspect pharyngeal dysphagia secondary to overt cl infiltrates, right greater than left are similar to the prior. \" CT brain 10/3/18 indicated \"1. Large area of encephalomalacia in the left hemisphere consistent with large left MCA distribution infarct.  Infarcts in the left thalamus, left midbrain and sathya Bolus Retrieval (VFSS - Thin Liquids): Impaired  Bilabial Seal (VFSS - Thin Liquids): Impaired  Bolus Formation (VFSS - Thin Liquids): Intact  Bolus Propulsion (VFSS - Thin Liquids): Impaired  Retention (VFSS - Thin Liquids):  Intact  Triggered at: Pyriform Penetration Aspiration Scale Score: Score 7: Material enters the airway, passes below the vocal folds, and is not ejected from the trachea despite effort[JM.2]       Overall Impression: Patient presents with mild oral and severe pharyngeal dysphagia arlen YRN.1 - YENIFER Yao on 10/4/2018 11:35 AM  YRN. 2 - YENIFER Yao on 10/4/2018 12:45 PM  YRN. 3 - YENIFER Yao on 10/4/2018 12:34 PM                        SLP Note - SLP Notes      SLP Note signed by YENIFER Maldonado at 1 Recommendations posted at bedside        GOALS  Goal 1 The patient will tolerate mechanical soft ground consistency and nectar thick (via teaspoon only) liquids without overt signs or symptoms of aspiration with 100 % accuracy over 2 session(s).  Goal met;

## (undated) NOTE — IP AVS SNAPSHOT
[address]            (For Outpatient Use Only) Initial Admit Date: 9/19/2018   Inpt/Obs Admit Date: Inpt: 9/20/18 / Obs: 09/19/18   Discharge Date:    Hospital Acct:  [de-identified]   MRN: [de-identified]   CSN: [id number]        [id number] Account Financial Class: Medicare    October 10, 2018

## (undated) NOTE — IP AVS SNAPSHOT
[address]            (For Outpatient Use Only) Initial Admit Date: 9/6/2018   Inpt/Obs Admit Date: Inpt: 9/8/18 / Obs: 09/06/18   Discharge Date:    Hospital Acct:  [de-identified]   MRN: [de-identified]   CSN: [id number]        [facility] Hospital Account Financial Class: Medicare    September 11, 2018

## (undated) NOTE — IP AVS SNAPSHOT
Patient Demographics     Address  [place] [address]  [place] [address] Phone  [phone] (Home) *Preferred*  [phone] [facility]) E-mail Address  [email or web address]      Emergency Contact(s)     Name Relation Home Work [address] Ipratropium Bromide HFA 17 MCG/ACT Aers  Commonly known as:  ATROVENT HFA  Next dose due:  Was taking scheduled at hospital. Now prn      Inhale 1 puff into the lungs every 4 to 6 hours as needed for Wheezing.    MARIEL Gudino         IRON 100 PLUS OR TiZANidine HCl 2 MG Tabs     Information about where to get these medications is not yet available    Ask your nurse or doctor about these medications  acetaminophen 325 MG Tabs  aspirin 325 MG Tabs           [id number] - MAR ACTION REPORT  (last 24 hrs) pH Urine 6.0 4.5 - 8.0 — [facility]   Protein Urine Negative Negative mg/dl — [facility]   Urobilinogen Urine <2.0 0.2 - 2.0 mg/dL — [facility]   Nitrite Urine Negative Negative — [facility]   Leukocyte Esterase Urine Negative Negative — [facility] Blood Culture FREQ X 2 [[id number]] Collected:  09/06/18 9330    Order Status:  Completed Lab Status:  Preliminary result Updated:  09/10/18 1900    Specimen:  Blood,peripheral      Blood Culture Result No Growth 4 Days    Blood Culture FREQ X 2 [[id number] questions, trouble swallowing and urinary / fecal incontinence. Family states he can pivot with assistance but has not been ambulatory. Right arm was affected by the stroke and he was recommended to keep it in a sling.  Patient had trouble swallowing and wa METHOTREXATE OR Take by mouth.  Disp:  Rfl:  Past Week at Unknown time   NON FORMULARY Paracetamol 500mg 1 tablet as needed for headaches or fever Disp:  Rfl:  Past Week at Unknown time   NON FORMULARY nitrofurantonia pt takes 1 tablet by mouth once a day f · CONCLUSION:    Extensive interstitial and airspace disease showing slight decrease from previous. The appearance is that of extensive chronic lung disease such as interstitial fibrosis, probably with superimposed edema or pneumonia that is improving.   [place] # hyponatremia, hypotension  - both resolved after IVF    # RA  - per family, history of RA , + RF noted in 2016  - MTX and low dose po prednisone on hold while NPO[KK. 2]    # Dysphagia  - NPO per SLP with video swallow in the am    # DVT Prophylaxis:   - - apprec pulmonary evaluation  - NPO, speech consulted    # AMS with h/o CVA  - CT head reviewed - findings c/w old infarct  - UA ordered, not yet obtained  - improving per family     Full plan of care as above.      Oraleduarda Yuan MD  [facility] IM Hospitalist  Pa states he can pivot with assistance but has not been ambulatory. Right arm was affected by the stroke and he was recommended to keep it in a sling. Patient had trouble swallowing and was being fed liquids with medicine dropper.      Family visited patient i NON FORMULARY Paracetamol 500mg 1 tablet as needed for headaches or fever Disp:  Rfl:  Past Week at Unknown time   NON FORMULARY nitrofurantonia pt takes 1 tablet by mouth once a day for UTI per pt MD list Disp:  Rfl:           Review of Systems:    A comp slight decrease from previous. The appearance is that of extensive chronic lung disease such as interstitial fibrosis, probably with superimposed edema or pneumonia that is improving. No large effusion or sign of pneumothorax. Stable heart size.   Vascul - per family, history of RA , + RF noted in 2016  - MTX and low dose po prednisone on hold while NPO[KK. 2]    # Dysphagia  - NPO per SLP with video swallow in the am    # DVT Prophylaxis:   - Heparin sq q 12    Dispo: Full code. [KK.1]   Dispo: suspect akira Since pt's CVA 4 mths ago he has had little to no rehab. He is not able to do much of anything for himself though what he is able to do depends on which family member you talk to and when you talk with them.   Per his son, his brother (pt's other son) said Years of education: N/A  Number of children: N/A     Occupational History  None on file     Social History Main Topics   Smoking status: Former Smoker     Smokeless tobacco: Former User    Alcohol use Yes    Drug use: No    Sexual activity: Not on file CARDIOVASCULAR:  denies current chest pain   GI:  denies abdominal pain  :  denies dysuria or changes in stream   MUSCULOSKELETAL:  denies back pain   NEURO:  denies headaches, no strokes or seizure history   PSYCHE:  denies depression or anxiety   HEMAT Abdomen:     Soft, non-tender, bowel sounds active all four quadrants,     no masses, no organomegaly   Extremities:   RUE contracted   Pulses:   2+ and symmetric all extremities   Skin:   Skin color, texture, turgor normal, no rashes or lesions   Neurolog he is currently  -may be related to infection  2. Pulm Fibrosis  -chronic, progressive  -consider IPF  -given other medical issues and lack of resp symptoms would not pursue aggressive w/u  3.  Debris/Mucus in airways  -suspect aspiration of food/liquid  -N Therapy significant co-morbidities:  Recent L MCA CVA 5/2018, dysphagia, pulmonary fibrosis, anemia, RA        Problem List[CM. 1]  Principal Problem:    Cerebrovascular accident (CVA) due to other mechanism [facility])  Active Problems:    Cerebrovascular accide Lower extremity ROM is within functional limits except for the following:   Right Hip flexion <1/2 PROM, no AROM  Left Hip flexion <1/2 AROM  Right Knee extension <1/2 PROM, no AROM  Left Knee extension 1/2 AROM  Right Knee flexion <1/2 PROM, no AROM  Le increase activity on the pt's right side of the body. All in agreement. Pt with HOB elevated, with Max A x 2 for supine<>sit to EOB. Once sitting EOB, pt with Max A for sitting. L UE placed in better positioning, and cued pt for upright sitting.  Pt sat Functional outcome measures completed include: The AM-PAC '6-Clicks' Inpatient Basic Mobility Short Form was completed and this patient is demonstrating a 86.62% degree of impairment in mobility.  Research supports that patients with this level of impairmen Goal #4 Pt to perform supine<>sit to EOB with moderate assistance   Goal #5 PT to assess sit<>stand tranfers when appropriate. Goal #6    Goal Comments: Goals established on 9/9/2018[CM. 1]     Attribution [place]    CM. 1 - Dillan Gilbert on 9/9/2018 1 Lives With: Son  Drives: Yes  Patient Owned Equipment: (JOSE ALFREDO, R UE sling)       Prior Level of [place]: Pt is aphasic. Son, Christin Salcedo., able to provide history.  He states prior to stroke patient living with son and family here in [place], returns ADDITIONAL TESTS                                    NEUROLOGICAL FINDINGS                      ACTIVITY TOLERANCE      AM-PAC '6-Clicks' INPATIENT SHORT FORM - BASIC MOBILITY  How much difficulty does the patient currently have. ..  -   Turning over in bed Placed bed in semi chair position and RN notified of this. Also discussed with family to stay with pt to monitor and pt in agreement.      Discussed with pt's family the importance of encouraging pt to utilize the L UE more for feeding, reaching, etc. Discu to continue to assess pt's overall functional mobility and address these deficits. Based on this evaluation, patient's clinical presentation is evolving and overall the evaluation complexity is considered moderate.     These impairments and comorbiditie Filed:  9/11/2018  7:46 AM Date of Service:  9/10/2018  2:39 PM Status:  Signed    :  Blossom De La Torre OT (Occupational Therapist)       OCCUPATIONAL THERAPY TREATMENT NOTE - INPATIENT     Room Number: 7703/3502-D  Session: 1   Number of Visits t Skilled Therapy Provided:[MS.1] Pt's daughter was present during the session. Daughter verbalized understanding about the splint fitting and care. Handouts already issued. Educated her about R UE PROM and issued written handout.   She demonstrated unders Pt seen for dysphagia tx to assess tolerance with recommended diet, ensure proper utilization of aspiration precautions and provide pt/family education, in addition to aphasia tx. Pt awake, alert, tracking SLP upon entry to room. No visitors present.    Sw Goal #2 The patient will answer [place] yes/no questions with 40 % accuracy within   3 session(s).     Progressing   Goal #3 The patient will recite Serialized Sentences/Sing Songs or automatic speech sequences with max/model cues with 50 % accuracy withi

## (undated) NOTE — LETTER
Stefany Bean [address] [facility] [address], [address] [facility]     PICC LINE INSERTION CONSENT     I agree to have a Peripherally Inserted Central Catheter (PICC) placed in my arm.    1. The PICC insertion procedure, care, maintenance, risks, benefits, and c goals; and potential problems that might occur during recuperation.  They also discussed reasonable alternatives to the PICC, including risks, benefits, and side effects related to the alternatives and risks related to not receiving this procedure      ____

## (undated) NOTE — LETTER
Setfany Bean [address]  [place], [address] [place] [address]    Consent for Operation  Date: __________________                                Time: _______________    1.  I authorize the performance upon Wil Bran the following operation:  Proced procedure has been videotaped, the surgeon will obtain the original videotape. The hospital will not be responsible for storage or maintenance of this tape.   7. For the purpose of advancing medical education, I consent to the admittance of observers to the STATEMENTS REQUIRING INSERTION OR COMPLETION WERE FILLED IN.     Signature of Patient:   ___________________________    When the patient is a minor or mentally incompetent to give consent:  Signature of person authorized to consent for patient: ____________ supplements, and pills I can buy without a prescription (including street drugs/illegal medications). Failure to inform my anesthesiologist about these medicines may increase my risk of anesthetic complications. iv.  If I am allergic to anything or have ha Anesthesiologist Signature     Date   Time  I have discussed the procedure and information above with the patient (or patient’s representative) and answered their questions. The patient or their representative has agreed to have anesthesia services.     ___

## (undated) NOTE — LETTER
Stefany Bean [address]  [place], [address] [place] [address]    Consent for Operation  Date: __________________                                Time: _______________    1.  I authorize the performance upon Aliya Garcia the following operation:  Proced procedure has been videotaped, the surgeon will obtain the original videotape. The hospital will not be responsible for storage or maintenance of this tape.     6. For the purpose of advancing medical education, I consent to the admittance of observers to t STATEMENTS REQUIRING INSERTION OR COMPLETION WERE FILLED IN.     Signature of Patient:   ___________________________    When the patient is a minor or mentally incompetent to give consent:  Signature of person authorized to consent for patient: ____________